# Patient Record
Sex: MALE | Race: ASIAN | NOT HISPANIC OR LATINO | Employment: FULL TIME | URBAN - METROPOLITAN AREA
[De-identification: names, ages, dates, MRNs, and addresses within clinical notes are randomized per-mention and may not be internally consistent; named-entity substitution may affect disease eponyms.]

---

## 2017-11-01 ENCOUNTER — OFFICE VISIT - HEALTHEAST (OUTPATIENT)
Dept: FAMILY MEDICINE | Facility: CLINIC | Age: 47
End: 2017-11-01

## 2017-11-01 DIAGNOSIS — K64.4 EXTERNAL HEMORRHOIDS: ICD-10-CM

## 2017-11-01 DIAGNOSIS — Z00.00 PHYSICAL EXAM: ICD-10-CM

## 2017-11-01 DIAGNOSIS — E78.5 HYPERLIPIDEMIA: ICD-10-CM

## 2017-11-01 ASSESSMENT — MIFFLIN-ST. JEOR: SCORE: 1497.35

## 2017-11-02 ENCOUNTER — AMBULATORY - HEALTHEAST (OUTPATIENT)
Dept: LAB | Facility: CLINIC | Age: 47
End: 2017-11-02

## 2017-11-02 DIAGNOSIS — E78.5 HYPERLIPIDEMIA: ICD-10-CM

## 2017-11-02 LAB
CHOLEST SERPL-MCNC: 231 MG/DL
FASTING STATUS PATIENT QL REPORTED: YES
HDLC SERPL-MCNC: 40 MG/DL
LDLC SERPL CALC-MCNC: 147 MG/DL
TRIGL SERPL-MCNC: 220 MG/DL

## 2017-11-03 ENCOUNTER — COMMUNICATION - HEALTHEAST (OUTPATIENT)
Dept: FAMILY MEDICINE | Facility: CLINIC | Age: 47
End: 2017-11-03

## 2018-09-26 ENCOUNTER — AMBULATORY - HEALTHEAST (OUTPATIENT)
Dept: NURSING | Facility: CLINIC | Age: 48
End: 2018-09-26

## 2018-09-26 DIAGNOSIS — Z23 FLU VACCINE NEED: ICD-10-CM

## 2018-11-14 ENCOUNTER — OFFICE VISIT - HEALTHEAST (OUTPATIENT)
Dept: FAMILY MEDICINE | Facility: CLINIC | Age: 48
End: 2018-11-14

## 2018-11-14 ENCOUNTER — RECORDS - HEALTHEAST (OUTPATIENT)
Dept: GENERAL RADIOLOGY | Facility: CLINIC | Age: 48
End: 2018-11-14

## 2018-11-14 ENCOUNTER — COMMUNICATION - HEALTHEAST (OUTPATIENT)
Dept: FAMILY MEDICINE | Facility: CLINIC | Age: 48
End: 2018-11-14

## 2018-11-14 DIAGNOSIS — S99.912A ANKLE INJURY, LEFT, INITIAL ENCOUNTER: ICD-10-CM

## 2018-11-14 DIAGNOSIS — S99.912A UNSPECIFIED INJURY OF LEFT ANKLE, INITIAL ENCOUNTER: ICD-10-CM

## 2018-11-14 DIAGNOSIS — E78.00 PURE HYPERCHOLESTEROLEMIA: ICD-10-CM

## 2018-11-14 DIAGNOSIS — L73.9 FOLLICULITIS: ICD-10-CM

## 2018-11-14 DIAGNOSIS — Z00.00 PHYSICAL EXAM: ICD-10-CM

## 2018-11-14 DIAGNOSIS — K64.4 EXTERNAL HEMORRHOIDS: ICD-10-CM

## 2018-11-14 LAB
ALBUMIN SERPL-MCNC: 4.5 G/DL (ref 3.5–5)
ALP SERPL-CCNC: 61 U/L (ref 45–120)
ALT SERPL W P-5'-P-CCNC: 46 U/L (ref 0–45)
ANION GAP SERPL CALCULATED.3IONS-SCNC: 10 MMOL/L (ref 5–18)
AST SERPL W P-5'-P-CCNC: 40 U/L (ref 0–40)
BILIRUB SERPL-MCNC: 0.7 MG/DL (ref 0–1)
BUN SERPL-MCNC: 21 MG/DL (ref 8–22)
CALCIUM SERPL-MCNC: 10.3 MG/DL (ref 8.5–10.5)
CHLORIDE BLD-SCNC: 102 MMOL/L (ref 98–107)
CHOLEST SERPL-MCNC: 252 MG/DL
CO2 SERPL-SCNC: 28 MMOL/L (ref 22–31)
CREAT SERPL-MCNC: 1.02 MG/DL (ref 0.7–1.3)
FASTING STATUS PATIENT QL REPORTED: YES
GFR SERPL CREATININE-BSD FRML MDRD: >60 ML/MIN/1.73M2
GLUCOSE BLD-MCNC: 98 MG/DL (ref 70–125)
HDLC SERPL-MCNC: 41 MG/DL
LDLC SERPL CALC-MCNC: 156 MG/DL
POTASSIUM BLD-SCNC: 4.6 MMOL/L (ref 3.5–5)
PROT SERPL-MCNC: 7.6 G/DL (ref 6–8)
SODIUM SERPL-SCNC: 140 MMOL/L (ref 136–145)
TRIGL SERPL-MCNC: 275 MG/DL

## 2018-11-14 ASSESSMENT — MIFFLIN-ST. JEOR: SCORE: 1513.56

## 2018-11-15 ENCOUNTER — RECORDS - HEALTHEAST (OUTPATIENT)
Dept: ADMINISTRATIVE | Facility: OTHER | Age: 48
End: 2018-11-15

## 2018-11-16 ENCOUNTER — COMMUNICATION - HEALTHEAST (OUTPATIENT)
Dept: FAMILY MEDICINE | Facility: CLINIC | Age: 48
End: 2018-11-16

## 2018-11-26 ENCOUNTER — RECORDS - HEALTHEAST (OUTPATIENT)
Dept: ADMINISTRATIVE | Facility: OTHER | Age: 48
End: 2018-11-26

## 2018-12-19 ENCOUNTER — RECORDS - HEALTHEAST (OUTPATIENT)
Dept: ADMINISTRATIVE | Facility: OTHER | Age: 48
End: 2018-12-19

## 2019-01-02 ENCOUNTER — RECORDS - HEALTHEAST (OUTPATIENT)
Dept: ADMINISTRATIVE | Facility: OTHER | Age: 49
End: 2019-01-02

## 2019-03-06 ENCOUNTER — RECORDS - HEALTHEAST (OUTPATIENT)
Dept: ADMINISTRATIVE | Facility: OTHER | Age: 49
End: 2019-03-06

## 2019-12-04 ENCOUNTER — OFFICE VISIT - HEALTHEAST (OUTPATIENT)
Dept: FAMILY MEDICINE | Facility: CLINIC | Age: 49
End: 2019-12-04

## 2019-12-04 DIAGNOSIS — E78.00 PURE HYPERCHOLESTEROLEMIA: ICD-10-CM

## 2019-12-04 DIAGNOSIS — L73.9 FOLLICULITIS: ICD-10-CM

## 2019-12-04 DIAGNOSIS — Z00.00 ROUTINE GENERAL MEDICAL EXAMINATION AT A HEALTH CARE FACILITY: ICD-10-CM

## 2019-12-04 LAB
ALBUMIN SERPL-MCNC: 4.4 G/DL (ref 3.5–5)
ALP SERPL-CCNC: 61 U/L (ref 45–120)
ALT SERPL W P-5'-P-CCNC: 42 U/L (ref 0–45)
ANION GAP SERPL CALCULATED.3IONS-SCNC: 8 MMOL/L (ref 5–18)
AST SERPL W P-5'-P-CCNC: 33 U/L (ref 0–40)
BILIRUB SERPL-MCNC: 0.9 MG/DL (ref 0–1)
BUN SERPL-MCNC: 14 MG/DL (ref 8–22)
CALCIUM SERPL-MCNC: 9.8 MG/DL (ref 8.5–10.5)
CHLORIDE BLD-SCNC: 102 MMOL/L (ref 98–107)
CHOLEST SERPL-MCNC: 252 MG/DL
CO2 SERPL-SCNC: 29 MMOL/L (ref 22–31)
CREAT SERPL-MCNC: 0.98 MG/DL (ref 0.7–1.3)
FASTING STATUS PATIENT QL REPORTED: YES
GFR SERPL CREATININE-BSD FRML MDRD: >60 ML/MIN/1.73M2
GLUCOSE BLD-MCNC: 108 MG/DL (ref 70–125)
HDLC SERPL-MCNC: 45 MG/DL
LDLC SERPL CALC-MCNC: 178 MG/DL
POTASSIUM BLD-SCNC: 4.5 MMOL/L (ref 3.5–5)
PROT SERPL-MCNC: 7.1 G/DL (ref 6–8)
PSA SERPL-MCNC: 1.1 NG/ML (ref 0–2.5)
SODIUM SERPL-SCNC: 139 MMOL/L (ref 136–145)
TRIGL SERPL-MCNC: 145 MG/DL

## 2019-12-04 RX ORDER — AMOXICILLIN 500 MG/1
500 TABLET, FILM COATED ORAL DAILY
Qty: 90 TABLET | Refills: 0 | Status: SHIPPED | OUTPATIENT
Start: 2019-12-04 | End: 2024-06-02

## 2019-12-04 ASSESSMENT — MIFFLIN-ST. JEOR: SCORE: 1490.88

## 2020-03-05 ENCOUNTER — COMMUNICATION - HEALTHEAST (OUTPATIENT)
Dept: FAMILY MEDICINE | Facility: CLINIC | Age: 50
End: 2020-03-05

## 2020-03-05 DIAGNOSIS — L73.9 FOLLICULITIS: ICD-10-CM

## 2020-03-11 ENCOUNTER — COMMUNICATION - HEALTHEAST (OUTPATIENT)
Dept: FAMILY MEDICINE | Facility: CLINIC | Age: 50
End: 2020-03-11

## 2020-03-11 DIAGNOSIS — L73.9 FOLLICULITIS: ICD-10-CM

## 2020-03-18 ENCOUNTER — COMMUNICATION - HEALTHEAST (OUTPATIENT)
Dept: FAMILY MEDICINE | Facility: CLINIC | Age: 50
End: 2020-03-18

## 2020-03-18 DIAGNOSIS — L73.9 FOLLICULITIS: ICD-10-CM

## 2021-03-02 ENCOUNTER — OFFICE VISIT - HEALTHEAST (OUTPATIENT)
Dept: FAMILY MEDICINE | Facility: CLINIC | Age: 51
End: 2021-03-02

## 2021-03-02 DIAGNOSIS — E55.9 VITAMIN D DEFICIENCY: ICD-10-CM

## 2021-03-02 DIAGNOSIS — E78.00 PURE HYPERCHOLESTEROLEMIA: ICD-10-CM

## 2021-03-02 DIAGNOSIS — Z00.00 ANNUAL PHYSICAL EXAM: ICD-10-CM

## 2021-03-02 DIAGNOSIS — N52.9 ERECTILE DYSFUNCTION, UNSPECIFIED ERECTILE DYSFUNCTION TYPE: ICD-10-CM

## 2021-03-02 DIAGNOSIS — Z13.1 SCREENING FOR DIABETES MELLITUS: ICD-10-CM

## 2021-03-02 DIAGNOSIS — L73.9 FOLLICULITIS: ICD-10-CM

## 2021-03-02 DIAGNOSIS — Z00.00 HEALTHCARE MAINTENANCE: ICD-10-CM

## 2021-03-02 DIAGNOSIS — Z12.5 SPECIAL SCREENING FOR MALIGNANT NEOPLASM OF PROSTATE: ICD-10-CM

## 2021-03-02 LAB
ALBUMIN SERPL-MCNC: 4.4 G/DL (ref 3.5–5)
ALP SERPL-CCNC: 59 U/L (ref 45–120)
ALT SERPL W P-5'-P-CCNC: 50 U/L (ref 0–45)
ANION GAP SERPL CALCULATED.3IONS-SCNC: 10 MMOL/L (ref 5–18)
AST SERPL W P-5'-P-CCNC: 44 U/L (ref 0–40)
BILIRUB SERPL-MCNC: 0.9 MG/DL (ref 0–1)
BUN SERPL-MCNC: 15 MG/DL (ref 8–22)
CALCIUM SERPL-MCNC: 9.5 MG/DL (ref 8.5–10.5)
CHLORIDE BLD-SCNC: 104 MMOL/L (ref 98–107)
CHOLEST SERPL-MCNC: 258 MG/DL
CO2 SERPL-SCNC: 28 MMOL/L (ref 22–31)
CREAT SERPL-MCNC: 0.95 MG/DL (ref 0.7–1.3)
FASTING STATUS PATIENT QL REPORTED: YES
GFR SERPL CREATININE-BSD FRML MDRD: >60 ML/MIN/1.73M2
GLUCOSE BLD-MCNC: 110 MG/DL (ref 70–125)
HBA1C MFR BLD: 6 %
HDLC SERPL-MCNC: 46 MG/DL
LDLC SERPL CALC-MCNC: 181 MG/DL
POTASSIUM BLD-SCNC: 4.5 MMOL/L (ref 3.5–5)
PROT SERPL-MCNC: 7.1 G/DL (ref 6–8)
PSA SERPL-MCNC: 1.5 NG/ML (ref 0–2.5)
SODIUM SERPL-SCNC: 142 MMOL/L (ref 136–145)
TRIGL SERPL-MCNC: 156 MG/DL

## 2021-03-02 RX ORDER — SILDENAFIL CITRATE 20 MG/1
TABLET ORAL
Qty: 30 TABLET | Refills: 0 | Status: SHIPPED | OUTPATIENT
Start: 2021-03-02 | End: 2022-06-21

## 2021-03-02 ASSESSMENT — MIFFLIN-ST. JEOR: SCORE: 1520.37

## 2021-03-03 LAB
25(OH)D3 SERPL-MCNC: 21.7 NG/ML (ref 30–80)
25(OH)D3 SERPL-MCNC: 21.7 NG/ML (ref 30–80)

## 2021-03-04 LAB
SHBG SERPL-SCNC: 27 NMOL/L (ref 11–80)
TESTOST FREE SERPL-MCNC: 6.75 NG/DL (ref 4.7–24.4)
TESTOST SERPL-MCNC: 306 NG/DL (ref 240–950)

## 2021-05-31 VITALS — BODY MASS INDEX: 25.87 KG/M2 | WEIGHT: 155.3 LBS | HEIGHT: 65 IN

## 2021-06-02 VITALS — BODY MASS INDEX: 26.33 KG/M2 | HEIGHT: 65 IN | WEIGHT: 158 LBS

## 2021-06-04 VITALS
SYSTOLIC BLOOD PRESSURE: 118 MMHG | HEART RATE: 66 BPM | WEIGHT: 153 LBS | OXYGEN SATURATION: 99 % | HEIGHT: 65 IN | BODY MASS INDEX: 25.49 KG/M2 | DIASTOLIC BLOOD PRESSURE: 80 MMHG

## 2021-06-05 VITALS
BODY MASS INDEX: 26.57 KG/M2 | SYSTOLIC BLOOD PRESSURE: 112 MMHG | DIASTOLIC BLOOD PRESSURE: 78 MMHG | HEIGHT: 65 IN | WEIGHT: 159.5 LBS | HEART RATE: 68 BPM

## 2021-06-06 NOTE — TELEPHONE ENCOUNTER
RN cannot approve Refill Request    RN can NOT refill this medication med is not covered by policy/route to provider       . Last office visit: Visit date not found Last Physical: 12/4/2019 Last MTM visit: Visit date not found Last visit same specialty: Visit date not found.  Next visit within 3 mo: Visit date not found  Next physical within 3 mo: Visit date not found      Makayla Vela, [facility] Triage/Med Refill 3/14/2020    Requested Prescriptions   Pending Prescriptions Disp Refills     amoxicillin (AMOXIL) 500 MG capsule [Pharmacy Med Name: Amoxicillin 500 MG Oral Capsule] 90 capsule 0     Sig: Take 1 capsule by mouth once daily       There is no refill protocol information for this order

## 2021-06-06 NOTE — TELEPHONE ENCOUNTER
RN cannot approve Refill Request    RN can NOT refill this medication med is not covered by policy/route to provider. Last office visit: Visit date not found Last Physical: 12/4/2019 Last MTM visit: Visit date not found Last visit same specialty: Visit date not found.  Next visit within 3 mo: Visit date not found  Next physical within 3 mo: Visit date not found      Amanda Cooper, [facility] Triage/Med Refill 3/5/2020    Requested Prescriptions   Pending Prescriptions Disp Refills     amoxicillin (AMOXIL) 500 MG capsule [Pharmacy Med Name: Amoxicillin 500 MG Oral Capsule] 90 capsule 0     Sig: Take 1 capsule by mouth once daily       There is no refill protocol information for this order

## 2021-06-07 NOTE — TELEPHONE ENCOUNTER
LM Pt should call  His dermatologist and ask for a refill on Amoxicillin. Dr Herring 12/4/2019 refill this med but noted that med was given to him by his dermatologist.

## 2021-06-13 NOTE — PROGRESS NOTES
ASSESSMENT:  1. Physical exam  Patient overall has good health habits though suboptimal exercise.    2. Hyperlipidemia  Mild elevation of cholesterol, controlling with lifestyle.    3. External hemorrhoids  Managed well with daily MiraLAX.      PLAN:  1.  Future laboratory studies as above, including lipid Cascade and comprehensive metabolic profile.  2.  Explained to the patient that he could take MiraLAX daily essentially indefinitely.  3.  Otherwise should be seen yearly.       No orders of the defined types were placed in this encounter.    Medications Discontinued During This Encounter   Medication Reason     ketoconazole (NIZORAL) 2 % shampoo Therapy completed     amoxicillin (AMOXIL) 500 MG capsule Therapy completed       No Follow-up on file.    CHIEF COMPLAINT:  Chief Complaint   Patient presents with     Annual Exam     Pt is NOT FASTING today- pending lab appt. tomorrow.        SUBJECTIVE:  Trini is a 46 y.o. male presenting to the clinic for an annual physical.    Health Maintenance: He has already received the influenza vaccine for the upcoming season. He is up-to-date on all of his other immunizations.    REVIEW OF SYSTEMS:   He continues to use Miralax for his hemorrhoids. They no longer bleed, and he does not struggle with constipation as long as he continues on the Miralax. His blood pressure is well-controlled. His lipids have been slightly elevated and will be rechecked. He has already eaten today and will therefore come back tomorrow morning for a lab only appointment. He deals with seasonal allergies, primarily during the spring. He denies any stomach issues or heartburn. He does not have any significant joint pains. He does not have a history of asthma or breathing problems.   All other systems are negative.    PFSH:  Social: He continues to work as a . He went to [facility] to visit family last summer.  Immunization History   Administered Date(s) Administered     DT (pediatric)  "06/29/2005     Hep A, historic 06/29/2005, 02/01/2008, 08/07/2008     Influenza, Seasonal, Inj PF 10/28/2010, 10/22/2011, 11/21/2012, 09/20/2013     Influenza, inj, historic 11/04/2008     Influenza, seasonal,quad inj 36+ mos 09/16/2016     MMR 01/19/1993     Td, historic 06/29/2005     Tdap 03/28/2013     Social History     Social History     Marital status:      Spouse name: N/A     Number of children: 2     Years of education: N/A     Occupational History           Social History Main Topics     Smoking status: Former Smoker     Types: Cigarettes     Quit date: 9/10/2003     Smokeless tobacco: Never Used      Comment: Light     Alcohol use Yes      Comment: Rare     Drug use: No     Sexual activity: Yes     Partners: Female     Other Topics Concern     Not on file     Social History Narrative    Diet- Regular, mostly  food.        Exercise- Not regular, walking 3-4x per week, playing with kids        Originally from [facility]; [place].     History reviewed. No pertinent past medical history.  Family History   Problem Relation Age of Onset     Cancer Mother      Ovarian     Parkinsonism Father      Hypotension Father      Benign prostatic hyperplasia Father      Hyperlipidemia Brother      Dementia Paternal Grandfather      Parkinsonism Paternal Grandfather        MEDICATIONS:  Current Outpatient Prescriptions   Medication Sig Dispense Refill     multivitamin therapeutic (THERAGRAN) tablet Take 1 tablet by mouth daily.       No current facility-administered medications for this visit.        TOBACCO USE:  History   Smoking Status     Former Smoker     Types: Cigarettes     Quit date: 9/10/2003   Smokeless Tobacco     Never Used     Comment: Light       VITALS:  Vitals:    11/01/17 1536   BP: 124/70   Patient Site: Right Arm   Patient Position: Sitting   Cuff Size: Adult Regular   Pulse: 78   Weight: 155 lb 4.8 oz (70.4 kg)   Height: 5' 4.75\" (1.645 m)     Wt Readings from Last 3 " Encounters:   11/01/17 155 lb 4.8 oz (70.4 kg)   09/16/16 148 lb (67.1 kg)   09/10/15 152 lb (68.9 kg)       PHYSICAL EXAM:  Constitutional:   Reveals an alert, pleasant, talkative male.  Vitals: per nursing notes.  HEENT: Right Ear: External ear normal.   Left Ear: External ear normal.   Nose: Nose normal.   Mouth/Throat: Oropharynx is clear and moist.   Eyes: Conjunctivae and EOM are normal. Pupils are equal, round, and reactive to light. Right eye exhibits no discharge. Left eye exhibits no discharge.   Neck:  Supple, no carotid bruits or adenopathy.  Back:  No spine or CVA pain.  Thorax:  No bony deformities.  Lungs: Clear to A&P without rales or wheezes.  Respiratory effort normal.  Cardiac:   Regular rate and rhythm, normal S1, S2, no murmur or gallop.  Abdomen:  Soft, active bowel sounds without bruits, mass, or tenderness.  Extremities:   No peripheral edema, pulses in the feet intact.    Skin:  No jaundice, peripheral cyanosis or lesions to suggest malignancy.  Neuro:  Alert and oriented. Cranial nerves, motor, sensory exams are intact.  No gross focal deficits.  Psychiatric:  Memory intact, mood appropriate.    DATA REVIEWED:  Additional History from Old Records Summarized (2): None.  Decision to Obtain Records (1): None.  Radiology Tests Summarized or Ordered (1): None.  Labs Reviewed or Ordered (1): Ordered labs; lipid cascade, comprehensive metabolic panel.   Medicine Test Summarized or Ordered (1): None.  Independent Review of EKG, X-RAY, or RAPID STREP (2 each): None.     The visit lasted a total of 8 minutes face to face with the patient. Over 50% of the time was spent counseling and educating the patient about health maintenance.    IGadiel, am scribing for and in the presence of, Dr. Powell.    I, Dr. Powell, personally performed the services described in this documentation, as scribed by Gadiel Morelos in my presence, and it is both accurate and complete.    Total Data Points: 1

## 2021-06-15 NOTE — PROGRESS NOTES
Assessment:   1. Annual physical exam  His biggest issue is that his hemoglobin A1c came back at 6.0 today.  It sounds like he drinks soda on a daily basis.    2. Healthcare maintenance  We spent some time talking about the Shingrix vaccine.  He can start this at age 50.  Colonoscopy is to start at age 52.  His father has BPH and he would like a PSA checked today.    3. Erectile dysfunction, unspecified erectile dysfunction type  It sounds like there may be some psychosocial issues at play here.  I did give him a small prescription for sildenafil to use.  He would also like his testosterone checked today  - sildenafil (REVATIO) 20 mg tablet; Take three tablets 60 minutes before intercourse  Dispense: 30 tablet; Refill: 0  - Testosterone, Free and Total (BTEST)    4. Pure hypercholesterolemia  He does not meet criteria for statin drugs but that would change if he were to develop type 2 diabetes  - Lipid Profile  - Comprehensive Metabolic Panel    5. Screening for diabetes mellitus  He needs to cut down on his carbs and exercise more frequently.  - Glycosylated Hemoglobin A1c    6. Vitamin D deficiency  - Vitamin D, Total (25-Hydroxy)    7. Special screening for malignant neoplasm of prostate  - PSA (Prostatic-Specific Antigen), Annual Screen    8. Folliculitis  Uses amoxicillin intermittently as needed for folliculitis.  He is not currently dealing with a flare         Plan:     Patient Instructions   Blood pressure and other vital signs look good today.    Goal weight loss 10 pounds over the next calendar year.    1 scoop of Citrucel in an 8 ounce glass of water every day.    More vegetables.    No more soda.    Recheck cholesterol labs today.  Based on last year's labs, you are at a 2.9% 10-year stroke/heart attack risk.  This does not meet criteria for cholesterol medications at this point.    We will check a hemoglobin A1c this year.    We will check testosterone levels; if abnormal, we will refer you onto  endocrinology.    Prescription for Revatio 20 mg tablets sent into the [facility] pharmacy.  Take 3 tablets 60 minutes before sexual activity.    I would recommend the Shingrix shingles vaccine when you turn 50 years old    Results of today's labs will be [facility] to you [facility].    Follow-up in 1 year, sooner if needed.      Subjective:     Here for physical exam. Chart reviewed       No past medical history on file.  No past surgical history on file.  Patient has no known allergies.  Family History   Problem Relation Age of Onset     Cancer Mother         Ovarian     Parkinsonism Father      Hypotension Father      Benign prostatic hyperplasia Father      Hyperlipidemia Brother      Dementia Paternal Grandfather      Parkinsonism Paternal Grandfather      Social History     Socioeconomic History     Marital status:      Spouse name: Not on file     Number of children: 2     Years of education: Not on file     Highest education level: Not on file   Occupational History     Occupation:    Social Needs     Financial resource strain: Not on file     Food insecurity     Worry: Not on file     Inability: Not on file     Transportation needs     Medical: Not on file     Non-medical: Not on file   Tobacco Use     Smoking status: Former Smoker     Types: Cigarettes     Quit date: 9/10/2003     Years since quittin.4     Smokeless tobacco: Never Used     Tobacco comment: Light   Substance and Sexual Activity     Alcohol use: No     Frequency: Never     Comment:       Drug use: No     Sexual activity: Yes     Partners: Female   Lifestyle     Physical activity     Days per week: Not on file     Minutes per session: Not on file     Stress: Not on file   Relationships     Social connections     Talks on phone: Not on file     Gets together: Not on file     Attends Confucianism service: Not on file     Active member of club or organization: Not on file     Attends meetings of clubs or  "organizations: Not on file     Relationship status: Not on file     Intimate partner violence     Fear of current or ex partner: Not on file     Emotionally abused: Not on file     Physically abused: Not on file     Forced sexual activity: Not on file   Other Topics Concern     Not on file   Social History Narrative    Diet- Regular, mostly  food.        Exercise- Treadmill, exercise videos, walking 3-4x per week, playing with kids        Originally from [place]; [place].         Review of Systems  Please see form B           Objective:     Vitals:    03/02/21 0800   BP: 112/78   Pulse: 68   Weight: 159 lb 8 oz (72.3 kg)   Height: 5' 5\" (1.651 m)       Physical  General Appearance: Alert, cooperative, no distress, appears stated age  Head: Normocephalic, without obvious abnormality, atraumatic  Eyes: PERRL, fundi not observed, EOM's intact  Ears: Normal TM's and external ear canals bilateral  Nose: No abnormalities noted  Mouth and Throat: Normal appearance   Neck: Supple, symmetrical, trachea midline, no adenopathy or thyromegally,  no tenderness/mass/nodules; no carotid bruit or JVD  Back: no CVA tenderness or spinous process pain  Lungs: Clear to auscultation bilaterally, good air movent  Heart: Regular rate and rhythm, S1 and S2 normal, no murmur, rub, or gallop,  Abdomen: Soft, non-tender, no masses, no organomegaly  Genitourinary:.  No hernias   Rectal exam: Not done  Musculoskeletal: No gross abnormalities    Extremities: Extremities normal, atraumatic, no cyanosis or edema, pulses 2/4 and symmetric  Skin:  no  worrisome lesions noted  Lymph nodes: Cervical, supraclavicular, groin, and axillary nodes normal  Neurologic: CN2-12 intact, normal sensation, DTRs 2/4 and symmetric.   Psychiatric:  normal mood and affect.      Current Outpatient Medications   Medication Sig Dispense Refill     amoxicillin (AMOXIL) 500 MG tablet Take 1 tablet (500 mg total) by mouth daily. (Patient taking differently: Take " 500 mg by mouth as needed. ) 90 tablet 0     MULTIVITAMIN ORAL Take by mouth daily.       sildenafil (REVATIO) 20 mg tablet Take three tablets 60 minutes before intercourse 30 tablet 0     No current facility-administered medications for this visit.

## 2021-06-15 NOTE — PATIENT INSTRUCTIONS - HE
Blood pressure and other vital signs look good today.    Goal weight loss 10 pounds over the next calendar year.    1 scoop of Citrucel in an 8 ounce glass of water every day.    More vegetables.    No more soda.    Recheck cholesterol labs today.  Based on last year's labs, you are at a 2.9% 10-year stroke/heart attack risk.  This does not meet criteria for cholesterol medications at this point.    We will check a hemoglobin A1c this year.    We will check testosterone levels; if abnormal, we will refer you onto endocrinology.    Prescription for Revatio 20 mg tablets sent into the [facility] pharmacy.  Take 3 tablets 60 minutes before sexual activity.    I would recommend the Shingrix shingles vaccine when you turn 50 years old    Results of today's labs will be made available to you on [facility].    Follow-up in 1 year, sooner if needed.

## 2021-06-16 PROBLEM — L73.9 FOLLICULITIS: Status: ACTIVE | Noted: 2018-11-14

## 2021-06-26 NOTE — PROGRESS NOTES
Progress Notes by Loy Powell MD at 11/14/2018  3:30 PM     Author: Loy Powell MD Service: -- Author Type: Physician    Filed: 11/14/2018  5:41 PM Encounter Date: 11/14/2018 Status: Signed    : Loy Powell MD (Physician)       MALE PREVENTATIVE EXAM    Assessment and Plan:       1. Physical exam  Patient overall has good health habits.    2.  Hyperlipidemia  Mild elevation in the could be some genetics behind this.  - Comprehensive Metabolic Panel  - Lipid Cascade    3. External hemorrhoids  Conservative management, cream and stool softeners as needed.    4. Ankle injury, left, initial encounter  Left ankle x-ray shows a left distal fibular fracture.  - XR Ankle Left 3 or More VWS; Future    5. Folliculitis  Daily amoxicillin controlled, followed by dermatology.    PLAN:  1.  Left ankle x-ray results reviewed, I left a message on the patient's phone, I will put in an urgent referral for podiatry.  In the meantime I told him to minimize weightbearing if he has any type of support boot he can use that otherwise Ace wrap, ice or anti-inflammatories.  2.  Laboratory studies as above.  3.  Otherwise patient can continue his same occasions, should be seen again in 1 year.        Next follow up:  No Follow-up on file.    Immunization Review  Adult Imm Review: No immunizations due today    I discussed the following with the patient:   Adult Healthy Living: Importance of regular exercise  Healthy nutrition        Subjective:   Chief Complaint: Trini Salter is an 47 y.o. male here for a preventative health visit.     HPI:  Physical    Patient states that he has no changes to his health. He mentions that last night he twisted his left ankle. He is able to walk on his ankle with moderate pain but at a slower pace.     He explains that he feels more tired and fatigued after activity than he did in the past. He notes that he sleeps well but occasionally wakes up during the night and is able to fall right  "back to sleep. He exercises often by using the treadmill and exercise videos that consist of light lifting. He takes an antibiotic for his skin and he regularly sees a dermatologist. He also has a history of hemorrhoids which are well controlled and he takes Miralax when necessary.        Healthy Habits  Are you taking a daily aspirin? No  Do you typically exercising at least 40 min, 3-4 times per week?  Yes  Do you usually eat at least 4 servings of fruit and vegetables a day, include whole grains and fiber and avoid regularly eating high fat foods? Yes  Have you had an eye exam in the past two years? Yes  Do you see a dentist twice per year? Yes  Do you have any concerns regarding sleep? No    Safety Screen  If you own firearms, are they secured in a locked gun cabinet or with trigger locks? The patient does not own any firearms  Do you feel you are safe where you are living?: Yes (11/14/2018  3:38 PM)  Do you feel you are safe in your relationship(s)?: Yes (11/14/2018  3:38 PM)      Review of Systems:  Please see above.  The rest of the review of systems are negative for all systems.     Cancer Screening     Patient has no health maintenance due at this time          Patient Care Team:  Loy Powell MD as PCP - General (Family Medicine)        History     Reviewed By Date/Time Sections Reviewed    Loy Powell MD 11/14/2018  4:02 PM Medical    Loy Powell MD 11/14/2018  4:00 PM Social Documentation    Loy Powell MD 11/14/2018  3:55 PM Medical, Surgical, Tobacco, Alcohol, Drug Use, Sexual Activity, Family, Social Documentation, Socioeconomic, Lifestyle, Relationships    Shanna Dominguez [facility] 11/14/2018  3:46 PM Family    Shanna Dominguez [facility] 11/14/2018  3:45 PM Tobacco            Objective:   Vital Signs:   Visit Vitals  /82   Pulse 62   Temp 98.2  F (36.8  C) (Oral)   Resp 16   Ht 5' 5\" (1.651 m)   Wt 158 lb (71.7 kg)   SpO2 100%   BMI 26.29 kg/m           PHYSICAL EXAM  Constitutional:   Reveals a " who appears his stated age.  Vitals: per nursing notes.  HEENT: Right Ear: External ear normal.   Left Ear: External ear normal.   Nose: Nose normal.   Mouth/Throat: Oropharynx is clear and moist.   Eyes: Conjunctivae and EOM are normal. Pupils are equal, round, and reactive to light. Right eye exhibits no discharge. Left eye exhibits no discharge.   Neck:  Supple, no carotid bruits or adenopathy.  Back:  No spine or CVA pain.  Thorax:  No bony deformities.  Lungs: Clear to A&P without rales or wheezes.  Respiratory effort normal.  Cardiac:   Regular rate and rhythm, normal S1, S2, no murmur or gallop.  Abdomen:  Soft, active bowel sounds without bruits, mass, or tenderness.  Extremities:   No peripheral edema, pulses in the feet intact. Ankle: Left ankle swelling and tenderness, lateral joint  Skin:  No jaundice, peripheral cyanosis or lesions to suggest malignancy.  Neuro:  Alert and oriented. Cranial nerves, motor, sensory exams are intact.  No gross focal deficits.  Psychiatric:  Memory intact, mood appropriate.    ADDITIONAL HISTORY SUMMARIZED (2): None.   DECISION TO OBTAIN EXTRA INFORMATION (1): None.   RADIOLOGY TESTS (1): None.  LABS (1): Labs ordered.  MEDICINE TESTS (1): None.  INDEPENDENT REVIEW (2 each): Ordered and reviewed X-ray: Left distal fibular fracture    Total data points = 3    Total time was 11 minutes, greater than 50% counseling and coordinating care regarding the above issues.        The ASCVD Risk score ([place] MARCELA Aguilar., et al., 2013) failed to calculate for the following reasons:    The BP treatment status is invalid         Medication List           Accurate as of 11/14/18  4:21 PM. If you have any questions, ask your nurse or doctor.               CONTINUE taking these medications    amoxicillin 500 MG tablet  Also known as:  AMOXIL  INSTRUCTIONS:  Take 500 mg by mouth daily.           STOP taking these medications    multivitamin therapeutic tablet  Also known as:  THERAGRAN  Stopped by:   Loy Powell MD            Additional Screenings Completed Today:     By signing my name below, I, Wilfredo Richards, attest that this documentation has been prepared under the direction and in the presence of Dr. Loy Powell.  Electronic Signature: Aguilar Munoz. 11/14/2018 3:56 PM.    I, Dr. Powell, personally performed the services described in this documentation. All medical record entries made by the scribe were at my direction and in my presence. I have reviewed the chart and discharge instructions (if applicable) and agree that the record reflects my personal performance and is accurate and complete.

## 2021-06-28 NOTE — PROGRESS NOTES
Progress Notes by Santi Lazaro MD at 12/4/2019  8:00 AM     Author: Santi Lazaro MD Service: -- Author Type: Physician    Filed: 12/4/2019  9:19 AM Encounter Date: 12/4/2019 Status: Signed    : Santi Lazaro MD (Physician)       MALE PREVENTATIVE EXAM    Assessment and Plan:     Routine general medical examination at a health care facility  -     PSA (Prostatic-Specific Antigen), Annual Screen  We discussed healthy lifestyle, nutrition, cardiovascular risk reduction, self care, safety, sunscreen, seatbelt, and timing of cancer screening.  Health maintenance screening and immunizations reviewed with the patient.    Pure hypercholesterolemia  -     Lipid Cascade  -     Comprehensive Metabolic Panel  Not on meds  Counseled healthy lifestyle modifications    Folliculitis, scalp  Chronic intermittent use of amoxicillin  Will refill  Advised probiotics  -     amoxicillin (AMOXIL) 500 MG tablet; Take 1 tablet (500 mg total) by mouth daily.    Next follow up:  Return in about 1 year (around 12/4/2020).    Immunization Review  Adult Imm Review: No immunizations due today    I discussed the following with the patient:   Adult Healthy Living: Importance of regular exercise  Healthy nutrition  Getting adequate sleep  Stress management  Supplement use    Subjective:   Chief Complaint: Trini Salter is an 48 y.o. male here for a preventative health visit.     HPI:    Hyperlipidemia: He has a history of elevated cholesterol levels. He is not currently taking medications for his cholesterol. He tends to consume either rice or noodles every day. He does not normally consume large amounts of sugar. He inquires about what causes the high cholesterol.     Folliculitis: He has been taking amoxicillin 500 mg for his folliculitis through the dermatologist. He takes it twice a day for a week and then once a day for a few weeks. He does not take the medication continuously. He will take  "it for about a month and then not take it for a few months. The folliculitis will come back on the scalp if he does not take the amoxicillin. He has tried other medications, unspecified, without any success. He inquires about receiving a refill for amoxicillin today.     Health Maintenance: He is fasting today. He is due for a PSA screening today. His father has BPH. He had an influenza vaccination through work.     Review of Systems: Please see above. The rest of the review of systems are negative for all systems.     PSFH:  He has two children who are 11 and 12 years old. He is originally from [facility]. His parents still live there.     Healthy Habits  Are you taking a daily aspirin? No  Do you typically exercising at least 40 min, 3-4 times per week?  Yes  Do you usually eat at least 4 servings of fruit and vegetables a day, include whole grains and fiber and avoid regularly eating high fat foods? Yes  Have you had an eye exam in the past two years? Yes  Do you see a dentist twice per year? Yes  Do you have any concerns regarding sleep? YES wakes a few times     Safety Screen  If you own firearms, are they secured in a locked gun cabinet or with trigger locks? NO  Do you feel you are safe where you are living?: Yes (12/4/2019  7:44 AM)  Do you feel you are safe in your relationship(s)?: Yes (12/4/2019  7:44 AM)      Cancer Screening     Patient has no health maintenance due at this time          Patient Care Team:  Loy Powell MD as PCP - General (Family Medicine)  Loy Powell MD as Assigned PCP        History     Reviewed By Date/Time Sections Reviewed    Kymberly Toney CMA 12/4/2019  7:50 AM Tobacco            Objective:   Vital Signs:   Visit Vitals  /80 (Patient Site: Left Arm, Patient Position: Sitting, Cuff Size: Adult Regular)   Pulse 66   Ht 5' 5\" (1.651 m)   Wt 153 lb (69.4 kg)   SpO2 99%   BMI 25.46 kg/m           PHYSICAL EXAM  Constitutional: Patient is oriented to person, place, " and time. Patient appears well-developed and well-nourished. No distress.   Head: Normocephalic and atraumatic.   Right Ear: External ear normal.   Left Ear: External ear normal.   Nose: Nose normal.   Mouth/Throat: Oropharynx is clear and moist. No oropharyngeal exudate.   Eyes: Conjunctivae and EOM are normal. Pupils are equal, round, and reactive to light. Right eye exhibits no discharge. Left eye exhibits no discharge. No scleral icterus.   Neck: Neck supple. No JVD present. No tracheal deviation present. No thyromegaly present.   Cardiovascular: Normal rate, regular rhythm, normal heart sounds and intact distal pulses.   No murmur heard.   Pulmonary/Chest: Effort normal and breath sounds normal. No stridor. No respiratory distress. Patient has no wheezes, no rales, exhibits no tenderness.   Abdominal: Soft. Bowel sounds are normal. Patient exhibits no distension and no mass.  There is no tenderness. There is no rebound and no guarding.  No inguinal hernia on valsalva maneuver  Lymphadenopathy:  Patient has no cervical adenopathy.   Neurological: Patient is alert and oriented to person, place, and time. Patient has normal reflexes. No cranial nerve deficit. Coordination normal.   Skin: Skin is warm and dry. Patient is not diaphoretic. No erythema. No pallor. A few acne-like lesions on the scalp.  Psychiatric: Patient has good eye contact without any psychomotor retardation or stereotypic behaviors.  normal mood and affect. Judgment and thought content normal.   Speech is regular rate and rhythm.     The 10-year ASCVD risk score (Raphaelisidro MEDRANO Jr., et al., 2013) is: 3.6%    Values used to calculate the score:      Age: 48 years      Sex: Male      Is Non- : No      Diabetic: No      Tobacco smoker: No      Systolic Blood Pressure: 118 mmHg      Is BP treated: No      HDL Cholesterol: 41 mg/dL      Total Cholesterol: 252 mg/dL    ADDITIONAL HISTORY SUMMARIZED (2): Reviewed physical note from  11/14/18 showing mild hyperlipidemia.  DECISION TO OBTAIN EXTRA INFORMATION (1): None.   RADIOLOGY TESTS (1): None.  LABS (1): Reviewed labs from 11/14/18 and ordered labs today.  MEDICINE TESTS (1): None.  INDEPENDENT REVIEW (2 each): None.     The visit lasted a total of 13 minutes face to face with the patient. Over 50% of the time was spent counseling and educating the patient about continuous antibiotic usage and overall wellness.    IPascale, am scribing for and in the presence of, Dr. Herring.    IDr. Herring, personally performed the services described in this documentation, as scribed by Pascale Velasco in my presence, and it is both accurate and complete.    Total data points: 3       Medication List          Accurate as of December 4, 2019  9:17 AM. If you have any questions, ask your nurse or doctor.            CONTINUE taking these medications    amoxicillin 500 MG tablet  Also known as:  AMOXIL  INSTRUCTIONS:  Take 1 tablet (500 mg total) by mouth daily.              Where to Get Your Medications      These medications were sent to [facility] [address] [place] [id number]    Phone:  [phone]     amoxicillin 500 MG tablet         Additional Screenings Completed Today:

## 2021-10-11 ENCOUNTER — HEALTH MAINTENANCE LETTER (OUTPATIENT)
Age: 51
End: 2021-10-11

## 2022-05-10 ENCOUNTER — TRANSFERRED RECORDS (OUTPATIENT)
Dept: HEALTH INFORMATION MANAGEMENT | Facility: CLINIC | Age: 52
End: 2022-05-10
Payer: COMMERCIAL

## 2022-05-22 ENCOUNTER — HEALTH MAINTENANCE LETTER (OUTPATIENT)
Age: 52
End: 2022-05-22

## 2022-06-21 ENCOUNTER — OFFICE VISIT (OUTPATIENT)
Dept: FAMILY MEDICINE | Facility: CLINIC | Age: 52
End: 2022-06-21
Payer: COMMERCIAL

## 2022-06-21 VITALS
SYSTOLIC BLOOD PRESSURE: 116 MMHG | BODY MASS INDEX: 25.21 KG/M2 | OXYGEN SATURATION: 98 % | HEIGHT: 65 IN | DIASTOLIC BLOOD PRESSURE: 64 MMHG | WEIGHT: 151.3 LBS | HEART RATE: 60 BPM

## 2022-06-21 DIAGNOSIS — Z00.00 ROUTINE GENERAL MEDICAL EXAMINATION AT A HEALTH CARE FACILITY: Primary | ICD-10-CM

## 2022-06-21 DIAGNOSIS — R73.03 PREDIABETES: ICD-10-CM

## 2022-06-21 DIAGNOSIS — Z12.11 SCREEN FOR COLON CANCER: ICD-10-CM

## 2022-06-21 DIAGNOSIS — E78.49 OTHER HYPERLIPIDEMIA: ICD-10-CM

## 2022-06-21 DIAGNOSIS — Z00.00 PHYSICAL EXAM: ICD-10-CM

## 2022-06-21 DIAGNOSIS — Z11.4 SCREENING FOR HIV (HUMAN IMMUNODEFICIENCY VIRUS): ICD-10-CM

## 2022-06-21 DIAGNOSIS — Z11.59 NEED FOR HEPATITIS C SCREENING TEST: ICD-10-CM

## 2022-06-21 LAB
ALBUMIN SERPL-MCNC: 3.9 G/DL (ref 3.5–5)
ALP SERPL-CCNC: 54 U/L (ref 45–120)
ALT SERPL W P-5'-P-CCNC: 38 U/L (ref 0–45)
ANION GAP SERPL CALCULATED.3IONS-SCNC: 8 MMOL/L (ref 5–18)
AST SERPL W P-5'-P-CCNC: 33 U/L (ref 0–40)
BILIRUB SERPL-MCNC: 0.7 MG/DL (ref 0–1)
BUN SERPL-MCNC: 17 MG/DL (ref 8–22)
CALCIUM SERPL-MCNC: 9.2 MG/DL (ref 8.5–10.5)
CHLORIDE BLD-SCNC: 106 MMOL/L (ref 98–107)
CHOLEST SERPL-MCNC: 223 MG/DL
CO2 SERPL-SCNC: 26 MMOL/L (ref 22–31)
CREAT SERPL-MCNC: 0.89 MG/DL (ref 0.7–1.3)
FASTING STATUS PATIENT QL REPORTED: YES
GFR SERPL CREATININE-BSD FRML MDRD: >90 ML/MIN/1.73M2
GLUCOSE BLD-MCNC: 106 MG/DL (ref 70–125)
HBA1C MFR BLD: 5.8 % (ref 0–5.6)
HDLC SERPL-MCNC: 46 MG/DL
LDLC SERPL CALC-MCNC: 152 MG/DL
POTASSIUM BLD-SCNC: 4 MMOL/L (ref 3.5–5)
PROT SERPL-MCNC: 6.8 G/DL (ref 6–8)
SODIUM SERPL-SCNC: 140 MMOL/L (ref 136–145)
TRIGL SERPL-MCNC: 124 MG/DL

## 2022-06-21 PROCEDURE — 80053 COMPREHEN METABOLIC PANEL: CPT | Performed by: FAMILY MEDICINE

## 2022-06-21 PROCEDURE — 36415 COLL VENOUS BLD VENIPUNCTURE: CPT | Performed by: FAMILY MEDICINE

## 2022-06-21 PROCEDURE — 83036 HEMOGLOBIN GLYCOSYLATED A1C: CPT | Performed by: FAMILY MEDICINE

## 2022-06-21 PROCEDURE — 80061 LIPID PANEL: CPT | Performed by: FAMILY MEDICINE

## 2022-06-21 PROCEDURE — 99396 PREV VISIT EST AGE 40-64: CPT | Performed by: FAMILY MEDICINE

## 2022-06-21 ASSESSMENT — ENCOUNTER SYMPTOMS
ARTHRALGIAS: 0
DIARRHEA: 0
HEMATURIA: 0
WEAKNESS: 0
ABDOMINAL PAIN: 0
EYE PAIN: 0
SORE THROAT: 0
DYSURIA: 0
FREQUENCY: 0
JOINT SWELLING: 0
SHORTNESS OF BREATH: 0
HEARTBURN: 0
COUGH: 0
NERVOUS/ANXIOUS: 0
PARESTHESIAS: 0
MYALGIAS: 0
HEMATOCHEZIA: 0
NAUSEA: 0
PALPITATIONS: 0
DIZZINESS: 0
CHILLS: 0
HEADACHES: 0
FEVER: 0
CONSTIPATION: 0

## 2022-06-21 NOTE — PROGRESS NOTES
SUBJECTIVE:   CC: Trini Salter is an 51 year old male who presents for preventative health visit.   Patient has been advised of split billing requirements and indicates understanding: Yes     HPI  Patient comes in for his annual physical examination.  Patient does have a history of hyperlipidemia going back quite a number of years attempting to control with lifestyle his most recent LDL was 181.    Patient also was diagnosed with prediabetes last year with an A1c of 6.0.    Patient has a history of recurrent folliculitis on daily amoxicillin followed by dermatology.    Patient also has a history of hemorrhoids he takes MiraLAX daily which helps control symptoms.    Overall the patient does have good health habits in terms of diet and exercise up-to-date on immunizations we will go ahead and refer for colonoscopy.            Healthy Habits:     Getting at least 3 servings of Calcium per day:  NO    Bi-annual eye exam:  Yes    Dental care twice a year:  Yes    Sleep apnea or symptoms of sleep apnea:  None    Diet:  Regular (no restrictions)    Frequency of exercise:  2-3 days/week    Duration of exercise:  15-30 minutes    Taking medications regularly:  Yes    Medication side effects:  Not applicable    PHQ-2 Total Score: 0    Additional concerns today:  No               Today's PHQ-2 Score:   PHQ-2 ( 1999 Pfizer) 6/21/2022   Q1: Little interest or pleasure in doing things 0   Q2: Feeling down, depressed or hopeless 0   PHQ-2 Score 0   Q1: Little interest or pleasure in doing things Not at all   Q2: Feeling down, depressed or hopeless Not at all   PHQ-2 Score 0       Abuse: Current or Past(Physical, Sexual or Emotional)- No  Do you feel safe in your environment? Yes    Have you ever done Advance Care Planning? (For example, a Health Directive, POLST, or a discussion with a medical provider or your loved ones about your wishes): No, advance care planning information given to patient to review.  Patient declined advance  care planning discussion at this time.    Social History     Tobacco Use     Smoking status: Former Smoker     Types: Cigarettes, Cigarettes     Quit date: 9/10/2003     Years since quittin.7     Smokeless tobacco: Never Used     Tobacco comment: Light   Substance Use Topics     Alcohol use: No     Comment: Alcoholic Drinks/day:       If you drink alcohol do you typically have >3 drinks per day or >7 drinks per week? No    Alcohol Use 2022   Prescreen: >3 drinks/day or >7 drinks/week? No       Last PSA:   Prostate Specific Antigen Screen   Date Value Ref Range Status   2021 1.5 0.0 - 2.5 ng/mL Final       Reviewed orders with patient. Reviewed health maintenance and updated orders accordingly - Yes  Lab work is in process    Reviewed and updated as needed this visit by clinical staff    Allergies                 Reviewed and updated as needed this visit by Provider                   History reviewed. No pertinent past medical history.   History reviewed. No pertinent surgical history.    Review of Systems   Constitutional: Negative for chills and fever.   HENT: Negative for congestion, ear pain, hearing loss and sore throat.    Eyes: Negative for pain and visual disturbance.   Respiratory: Negative for cough and shortness of breath.    Cardiovascular: Negative for chest pain, palpitations and peripheral edema.   Gastrointestinal: Negative for abdominal pain, constipation, diarrhea, heartburn, hematochezia and nausea.   Genitourinary: Negative for dysuria, frequency, genital sores, hematuria, impotence, penile discharge and urgency.   Musculoskeletal: Negative for arthralgias, joint swelling and myalgias.   Neurological: Negative for dizziness, weakness, headaches and paresthesias.   Psychiatric/Behavioral: Negative for mood changes. The patient is not nervous/anxious.      CONSTITUTIONAL: NEGATIVE for fever, chills, change in weight  INTEGUMENTARY/SKIN: NEGATIVE for worrisome rashes, moles or  "lesions  EYES: NEGATIVE for vision changes or irritation  ENT: NEGATIVE for ear, mouth and throat problems  RESP: NEGATIVE for significant cough or SOB  CV: NEGATIVE for chest pain, palpitations or peripheral edema  GI: NEGATIVE for nausea, abdominal pain, heartburn, or change in bowel habits   male: negative for dysuria, hematuria, decreased urinary stream, erectile dysfunction, urethral discharge  MUSCULOSKELETAL: NEGATIVE for significant arthralgias or myalgia  NEURO: NEGATIVE for weakness, dizziness or paresthesias  PSYCHIATRIC: NEGATIVE for changes in mood or affect    OBJECTIVE:   Ht 1.651 m (5' 5\")   BMI 26.54 kg/m      Physical Exam  GENERAL: healthy, alert and no distress  EYES: Eyes grossly normal to inspection, PERRL and conjunctivae and sclerae normal  HENT: ear canals and TM's normal, nose and mouth without ulcers or lesions  NECK: no adenopathy, no asymmetry, masses, or scars and thyroid normal to palpation  RESP: lungs clear to auscultation - no rales, rhonchi or wheezes  CV: regular rate and rhythm, normal S1 S2, no S3 or S4, no murmur, click or rub, no peripheral edema and peripheral pulses strong  ABDOMEN: soft, nontender, no hepatosplenomegaly, no masses and bowel sounds normal  MS: no gross musculoskeletal defects noted, no edema  SKIN: no suspicious lesions or rashes  NEURO: Normal strength and tone, mentation intact and speech normal  PSYCH: mentation appears normal, affect normal/bright    Diagnostic Test Results:  Labs reviewed in Epic    ASSESSMENT/PLAN:       (Z12.11) Screen for colon cancer  Comment: Patient is due for colorectal cancer screening  Plan: Adult Gastro Ref - Procedure Only             (Z00.00) Physical exam  Comment: Overall the patient does have good health have  Plan:      (E78.49) Hyperlipidemia  Comment: Elevated for a number of years attempting to control with lifestyle  Plan: Comprehensive metabolic panel, Lipid panel         reflex to direct LDL Fasting         " "    (R73.03) Prediabetes  Comment: Diagnosed in 2021  Plan: Hemoglobin A1c             PLAN:  1.  Colonoscopy  2.  Laboratory studies as above  3.  Encourage patient to focus is much as possible on improved diet and exercise.  4.  Patient otherwise should be seen yearly      Patient has been advised of split billing requirements and indicates understanding: Yes    COUNSELING:   Reviewed preventive health counseling, as reflected in patient instructions       Regular exercise       Healthy diet/nutrition    Estimated body mass index is 26.54 kg/m  as calculated from the following:    Height as of this encounter: 1.651 m (5' 5\").    Weight as of 3/2/21: 72.3 kg (159 lb 8 oz).         He reports that he quit smoking about 18 years ago. His smoking use included cigarettes and cigarettes. He has never used smokeless tobacco.      Counseling Resources:  ATP IV Guidelines  Pooled Cohorts Equation Calculator  FRAX Risk Assessment  ICSI Preventive Guidelines  Dietary Guidelines for Americans, 2010  USDA's MyPlate  ASA Prophylaxis  Lung CA Screening    Loy Powell MD  [facility]  "

## 2022-06-21 NOTE — LETTER
June 21, 2022      Trini Salter  [address] [facility] [id number]        Dear ,    We are writing to inform you of your test results.  The A1c is 5.8, this is in the range of prediabetes, but actually little better than it was last time, I want you to continue to focus on good diet and exercise to help keep sugar low.  Liver and kidney tests are normal.  Cholesterol is elevated actually though is better than it has been in the past therefore again good diet and exercise can help lower the cholesterol as well.      Resulted Orders   Comprehensive metabolic panel   Result Value Ref Range    Sodium 140 136 - 145 mmol/L    Potassium 4.0 3.5 - 5.0 mmol/L    Chloride 106 98 - 107 mmol/L    Carbon Dioxide (CO2) 26 22 - 31 mmol/L    Anion Gap 8 5 - 18 mmol/L    Urea Nitrogen 17 8 - 22 mg/dL    Creatinine 0.89 0.70 - 1.30 mg/dL    Calcium 9.2 8.5 - 10.5 mg/dL    Glucose 106 70 - 125 mg/dL    Alkaline Phosphatase 54 45 - 120 U/L    AST 33 0 - 40 U/L    ALT 38 0 - 45 U/L    Protein Total 6.8 6.0 - 8.0 g/dL    Albumin 3.9 3.5 - 5.0 g/dL    Bilirubin Total 0.7 0.0 - 1.0 mg/dL    GFR Estimate >90 >60 mL/min/1.73m2      Comment:      Effective December 21, 2021 eGFRcr in adults is calculated using the 2021 CKD-EPI creatinine equation which includes age and gender (Jolly victoria al., NEJ, DOI: 10.1056/YEGFdm3739373)   Lipid panel reflex to direct LDL Fasting   Result Value Ref Range    Cholesterol 223 (H) <=199 mg/dL    Triglycerides 124 <=149 mg/dL    Direct Measure HDL 46 >=40 mg/dL      Comment:      HDL Cholesterol Reference Range:     0-2 years:   No reference ranges established for patients under 2 years old  at [facility] for lipid analytes.    2-8 years:  Greater than 45 mg/dL     18 years and older:   Female: Greater than or equal to 50 mg/dL   Male:   Greater than or equal to 40 mg/dL    LDL Cholesterol Calculated 152 (H) <=129 mg/dL    Patient Fasting > 8hrs? Yes    Hemoglobin A1c   Result Value Ref  Range    Hemoglobin A1C 5.8 (H) 0.0 - 5.6 %      Comment:      Normal <5.7%   Prediabetes 5.7-6.4%    Diabetes 6.5% or higher     Note: Adopted from ADA consensus guidelines.       If you have any questions or concerns, please call the clinic at the number listed above.       Sincerely,      Loy Powell MD

## 2022-09-25 ENCOUNTER — HEALTH MAINTENANCE LETTER (OUTPATIENT)
Age: 52
End: 2022-09-25

## 2022-11-03 ENCOUNTER — TRANSFERRED RECORDS (OUTPATIENT)
Dept: HEALTH INFORMATION MANAGEMENT | Facility: CLINIC | Age: 52
End: 2022-11-03

## 2022-12-07 ENCOUNTER — TRANSFERRED RECORDS (OUTPATIENT)
Dept: HEALTH INFORMATION MANAGEMENT | Facility: CLINIC | Age: 52
End: 2022-12-07

## 2022-12-20 ENCOUNTER — TRANSFERRED RECORDS (OUTPATIENT)
Dept: HEALTH INFORMATION MANAGEMENT | Facility: CLINIC | Age: 52
End: 2022-12-20

## 2023-05-22 ENCOUNTER — PATIENT OUTREACH (OUTPATIENT)
Dept: CARE COORDINATION | Facility: CLINIC | Age: 53
End: 2023-05-22
Payer: COMMERCIAL

## 2023-06-06 ENCOUNTER — PATIENT OUTREACH (OUTPATIENT)
Dept: CARE COORDINATION | Facility: CLINIC | Age: 53
End: 2023-06-06
Payer: COMMERCIAL

## 2023-06-26 ENCOUNTER — OFFICE VISIT (OUTPATIENT)
Dept: FAMILY MEDICINE | Facility: CLINIC | Age: 53
End: 2023-06-26
Payer: COMMERCIAL

## 2023-06-26 VITALS
HEIGHT: 65 IN | TEMPERATURE: 97.1 F | WEIGHT: 151 LBS | DIASTOLIC BLOOD PRESSURE: 66 MMHG | OXYGEN SATURATION: 98 % | HEART RATE: 54 BPM | BODY MASS INDEX: 25.16 KG/M2 | SYSTOLIC BLOOD PRESSURE: 120 MMHG

## 2023-06-26 DIAGNOSIS — Z23 NEED FOR VACCINATION: ICD-10-CM

## 2023-06-26 DIAGNOSIS — R73.03 PREDIABETES: ICD-10-CM

## 2023-06-26 DIAGNOSIS — Z00.00 PHYSICAL EXAM: Primary | ICD-10-CM

## 2023-06-26 DIAGNOSIS — E78.49 OTHER HYPERLIPIDEMIA: ICD-10-CM

## 2023-06-26 DIAGNOSIS — L73.9 FOLLICULITIS: ICD-10-CM

## 2023-06-26 LAB
ALBUMIN SERPL BCG-MCNC: 4.8 G/DL (ref 3.5–5.2)
ALP SERPL-CCNC: 61 U/L (ref 40–129)
ALT SERPL W P-5'-P-CCNC: 49 U/L (ref 0–70)
ANION GAP SERPL CALCULATED.3IONS-SCNC: 11 MMOL/L (ref 7–15)
AST SERPL W P-5'-P-CCNC: 48 U/L (ref 0–45)
BILIRUB SERPL-MCNC: 0.6 MG/DL
BUN SERPL-MCNC: 15.2 MG/DL (ref 6–20)
CALCIUM SERPL-MCNC: 10 MG/DL (ref 8.6–10)
CHLORIDE SERPL-SCNC: 102 MMOL/L (ref 98–107)
CHOLEST SERPL-MCNC: 241 MG/DL
CREAT SERPL-MCNC: 0.91 MG/DL (ref 0.67–1.17)
DEPRECATED HCO3 PLAS-SCNC: 27 MMOL/L (ref 22–29)
GFR SERPL CREATININE-BSD FRML MDRD: >90 ML/MIN/1.73M2
GLUCOSE SERPL-MCNC: 110 MG/DL (ref 70–99)
HBA1C MFR BLD: 6 % (ref 0–5.6)
HDLC SERPL-MCNC: 45 MG/DL
LDLC SERPL CALC-MCNC: 165 MG/DL
NONHDLC SERPL-MCNC: 196 MG/DL
POTASSIUM SERPL-SCNC: 4.7 MMOL/L (ref 3.4–5.3)
PROT SERPL-MCNC: 7.3 G/DL (ref 6.4–8.3)
SODIUM SERPL-SCNC: 140 MMOL/L (ref 136–145)
TRIGL SERPL-MCNC: 154 MG/DL

## 2023-06-26 PROCEDURE — 99396 PREV VISIT EST AGE 40-64: CPT | Mod: 25 | Performed by: FAMILY MEDICINE

## 2023-06-26 PROCEDURE — 36415 COLL VENOUS BLD VENIPUNCTURE: CPT | Performed by: FAMILY MEDICINE

## 2023-06-26 PROCEDURE — 80061 LIPID PANEL: CPT | Performed by: FAMILY MEDICINE

## 2023-06-26 PROCEDURE — 80053 COMPREHEN METABOLIC PANEL: CPT | Performed by: FAMILY MEDICINE

## 2023-06-26 PROCEDURE — 99214 OFFICE O/P EST MOD 30 MIN: CPT | Mod: 25 | Performed by: FAMILY MEDICINE

## 2023-06-26 PROCEDURE — 90715 TDAP VACCINE 7 YRS/> IM: CPT | Performed by: FAMILY MEDICINE

## 2023-06-26 PROCEDURE — 90471 IMMUNIZATION ADMIN: CPT | Performed by: FAMILY MEDICINE

## 2023-06-26 PROCEDURE — 83036 HEMOGLOBIN GLYCOSYLATED A1C: CPT | Performed by: FAMILY MEDICINE

## 2023-06-26 ASSESSMENT — ENCOUNTER SYMPTOMS
HEARTBURN: 0
WEAKNESS: 0
PARESTHESIAS: 0
HEADACHES: 0
ARTHRALGIAS: 0
COUGH: 0
NAUSEA: 0
SORE THROAT: 0
PALPITATIONS: 0
CHILLS: 0
ABDOMINAL PAIN: 0
FEVER: 0
HEMATURIA: 0
EYE PAIN: 0
SHORTNESS OF BREATH: 0
DIZZINESS: 0
FREQUENCY: 0
HEMATOCHEZIA: 0
MYALGIAS: 0
DYSURIA: 0
JOINT SWELLING: 0
NERVOUS/ANXIOUS: 0
DIARRHEA: 0
CONSTIPATION: 0

## 2023-06-26 NOTE — PROGRESS NOTES
SUBJECTIVE:   CC: Trini is an 52 year old who presents for preventative health visit.       6/26/2023     8:28 AM   Additional Questions   Roomed by Gabino DOW     HPI:   Patient comes in for his annual physical examination.  The patient has been prediabetic of note he is trying to eat a little bit less white rice generally eats an Thai type diet, he has been historically physically active, there is not a family history of type 2 diabetes mellitus.    Patient also has a history of hyperlipidemia, there is some family history of this, attempting to control this with lifestyle.    The patient has a history of folliculitis, followed regularly by dermatology he is on daily amoxicillin which is his only medication.    Overall the patient does have good health habits he needs a tetanus booster today up-to-date with other immunizations of note he is up-to-date with colorectal cancer screening.    Otherwise no other significant change in his health status he has noticed however that he is not seen as well and uses reading glasses at least on occasion and not hearing as well though this is not interfering with function.        Healthy Habits:     Getting at least 3 servings of Calcium per day:  NO    Bi-annual eye exam:  Yes    Dental care twice a year:  Yes    Sleep apnea or symptoms of sleep apnea:  None    Diet:  Regular (no restrictions)    Frequency of exercise:  4-5 days/week    Duration of exercise:  15-30 minutes    Taking medications regularly:  Yes    Medication side effects:  None    PHQ-2 Total Score: 0    Additional concerns today:  No      Today's PHQ-2 Score:       6/26/2023     8:21 AM   PHQ-2 ( 1999 Pfizer)   Q1: Little interest or pleasure in doing things 0   Q2: Feeling down, depressed or hopeless 0   PHQ-2 Score 0   Q1: Little interest or pleasure in doing things Not at all   Q2: Feeling down, depressed or hopeless Not at all   PHQ-2 Score 0           Social History     Tobacco Use     Smoking status: Former      Types: Cigarettes     Quit date: 9/10/2003     Years since quittin.8     Smokeless tobacco: Never     Tobacco comments:     Light   Substance Use Topics     Alcohol use: No     Comment: Alcoholic Drinks/day:                2023     8:21 AM   Alcohol Use   Prescreen: >3 drinks/day or >7 drinks/week? No          No data to display                Last PSA:   Prostate Specific Antigen Screen   Date Value Ref Range Status   2021 1.5 0.0 - 2.5 ng/mL Final       Reviewed orders with patient. Reviewed health maintenance and updated orders accordingly - Yes  Lab work is in process    Reviewed and updated as needed this visit by clinical staff     Meds              Reviewed and updated as needed this visit by Provider                 History reviewed. No pertinent past medical history.   History reviewed. No pertinent surgical history.    Review of Systems   Constitutional: Negative for chills and fever.   HENT: Positive for hearing loss. Negative for congestion, ear pain and sore throat.    Eyes: Positive for visual disturbance. Negative for pain.   Respiratory: Negative for cough and shortness of breath.    Cardiovascular: Negative for chest pain, palpitations and peripheral edema.   Gastrointestinal: Negative for abdominal pain, constipation, diarrhea, heartburn, hematochezia and nausea.   Genitourinary: Negative for dysuria, frequency, genital sores, hematuria and urgency.   Musculoskeletal: Negative for arthralgias, joint swelling and myalgias.   Skin: Negative for rash.   Neurological: Negative for dizziness, weakness, headaches and paresthesias.   Psychiatric/Behavioral: Negative for mood changes. The patient is not nervous/anxious.      CONSTITUTIONAL: NEGATIVE for fever, chills, change in weight  INTEGUMENTARY/SKIN: NEGATIVE for worrisome rashes, moles or lesions  EYES: NEGATIVE for vision changes or irritation  ENT: NEGATIVE for ear, mouth and throat problems  RESP: NEGATIVE for significant  "cough or SOB  CV: NEGATIVE for chest pain, palpitations or peripheral edema  GI: NEGATIVE for nausea, abdominal pain, heartburn, or change in bowel habits   male: negative for dysuria, hematuria, decreased urinary stream, erectile dysfunction, urethral discharge  MUSCULOSKELETAL: NEGATIVE for significant arthralgias or myalgia  NEURO: NEGATIVE for weakness, dizziness or paresthesias  PSYCHIATRIC: NEGATIVE for changes in mood or affect    OBJECTIVE:   /66 (BP Location: Left arm, Patient Position: Sitting, Cuff Size: Adult Regular)   Pulse 54   Temp 97.1  F (36.2  C) (Temporal)   Ht 1.651 m (5' 5\")   Wt 68.5 kg (151 lb)   SpO2 98%   BMI 25.13 kg/m      Physical Exam  GENERAL: healthy, alert and no distress  EYES: Eyes grossly normal to inspection, PERRL and conjunctivae and sclerae normal  HENT: ear canals and TM's normal, nose and mouth without ulcers or lesions  NECK: no adenopathy, no asymmetry, masses, or scars and thyroid normal to palpation  RESP: lungs clear to auscultation - no rales, rhonchi or wheezes  CV: regular rate and rhythm, normal S1 S2, no S3 or S4, no murmur, click or rub, no peripheral edema and peripheral pulses strong  ABDOMEN: soft, nontender, no hepatosplenomegaly, no masses and bowel sounds normal  MS: no gross musculoskeletal defects noted, no edema  SKIN: no suspicious lesions or rashes  NEURO: Normal strength and tone, mentation intact and speech normal  PSYCH: mentation appears normal, affect normal/bright    Diagnostic Test Results:  Labs reviewed in Epic    ASSESSMENT/PLAN:   (Z00.00) Physical exam  (primary encounter diagnosis)  Comment: Overall the patient has very good health habits and is in good health  Plan: PRIMARY CARE FOLLOW-UP SCHEDULING             (E78.49) Hyperlipidemia  Comment: Elevated, attempting to control with lifestyle  Plan: Comprehensive metabolic panel, Lipid panel         reflex to direct LDL Fasting             (R73.03) Prediabetes  Comment: Noted " previously  Plan: Hemoglobin A1c             (Z23) Need for vaccination  Comment:    Plan: TDAP VACCINE (Adacel, Boostrix)             (L73.9) Folliculitis  Comment: On daily amoxicillin followed by dermatology  Plan:      PLAN:  1.  Tdap  2.  Laboratory studies as above  3.  Patient is followed regularly by dermatology  4.  Patient otherwise should be seen yearly    Patient has been advised of split billing requirements and indicates understanding: Yes      COUNSELING:   Reviewed preventive health counseling, as reflected in patient instructions       Regular exercise       Healthy diet/nutrition        He reports that he quit smoking about 19 years ago. His smoking use included cigarettes and cigarettes. He has never used smokeless tobacco.         Loy Powell MD  [facility]

## 2023-06-26 NOTE — LETTER
June 27, 2023      Trini Salter  [address] [facility] [address]        Dear ,    We are writing to inform you of your test results.  Sugar is slightly high at 110 and another blood test called A1c slightly elevated at 6.0, these are consistent with prediabetes this puts you at risk to develop diabetes but focus on good diet and exercise to help keep sugar low.  Liver and kidney tests are otherwise normal  Cholesterol is elevated, actually has been higher in the past, again good diet and exercise will help keep cholesterol low.    See us again in 1 year    Resulted Orders   Comprehensive metabolic panel   Result Value Ref Range    Sodium 140 136 - 145 mmol/L    Potassium 4.7 3.4 - 5.3 mmol/L    Chloride 102 98 - 107 mmol/L    Carbon Dioxide (CO2) 27 22 - 29 mmol/L    Anion Gap 11 7 - 15 mmol/L    Urea Nitrogen 15.2 6.0 - 20.0 mg/dL    Creatinine 0.91 0.67 - 1.17 mg/dL    Calcium 10.0 8.6 - 10.0 mg/dL    Glucose 110 (H) 70 - 99 mg/dL    Alkaline Phosphatase 61 40 - 129 U/L    AST 48 (H) 0 - 45 U/L      Comment:      Reference intervals for this test were updated on 6/12/2023 to more accurately reflect our healthy population. There may be differences in the flagging of prior results with similar values performed with this method. Interpretation of those prior results can be made in the context of the updated reference intervals.    ALT 49 0 - 70 U/L      Comment:      Reference intervals for this test were updated on 6/12/2023 to more accurately reflect our healthy population. There may be differences in the flagging of prior results with similar values performed with this method. Interpretation of those prior results can be made in the context of the updated reference intervals.      Protein Total 7.3 6.4 - 8.3 g/dL    Albumin 4.8 3.5 - 5.2 g/dL    Bilirubin Total 0.6 <=1.2 mg/dL    GFR Estimate >90 >60 mL/min/1.73m2   Lipid panel reflex to direct LDL Fasting   Result Value Ref Range    Cholesterol 241 (H)  <200 mg/dL    Triglycerides 154 (H) <150 mg/dL    Direct Measure HDL 45 >=40 mg/dL    LDL Cholesterol Calculated 165 (H) <=100 mg/dL    Non HDL Cholesterol 196 (H) <130 mg/dL    Narrative    Cholesterol  Desirable:  <200 mg/dL    Triglycerides  Normal:  Less than 150 mg/dL  Borderline High:  150-199 mg/dL  High:  200-499 mg/dL  Very High:  Greater than or equal to 500 mg/dL    Direct Measure HDL  Female:  Greater than or equal to 50 mg/dL   Male:  Greater than or equal to 40 mg/dL    LDL Cholesterol  Desirable:  <100mg/dL  Above Desirable:  100-129 mg/dL   Borderline High:  130-159 mg/dL   High:  160-189 mg/dL   Very High:  >= 190 mg/dL    Non HDL Cholesterol  Desirable:  130 mg/dL  Above Desirable:  130-159 mg/dL  Borderline High:  160-189 mg/dL  High:  190-219 mg/dL  Very High:  Greater than or equal to 220 mg/dL   Hemoglobin A1c   Result Value Ref Range    Hemoglobin A1C 6.0 (H) 0.0 - 5.6 %      Comment:      Normal <5.7%   Prediabetes 5.7-6.4%    Diabetes 6.5% or higher     Note: Adopted from ADA consensus guidelines.       If you have any questions or concerns, please call the clinic at the number listed above.       Sincerely,      Loy Powell MD

## 2024-03-07 ENCOUNTER — TRANSFERRED RECORDS (OUTPATIENT)
Dept: HEALTH INFORMATION MANAGEMENT | Facility: CLINIC | Age: 54
End: 2024-03-07
Payer: COMMERCIAL

## 2024-03-11 ENCOUNTER — TRANSFERRED RECORDS (OUTPATIENT)
Dept: HEALTH INFORMATION MANAGEMENT | Facility: CLINIC | Age: 54
End: 2024-03-11
Payer: COMMERCIAL

## 2024-03-11 LAB — GLUCOSE (EXTERNAL): 90 MG/DL (ref 70–99)

## 2024-03-12 ENCOUNTER — MEDICAL CORRESPONDENCE (OUTPATIENT)
Dept: SCHEDULING | Facility: CLINIC | Age: 54
End: 2024-03-12
Payer: COMMERCIAL

## 2024-03-12 ENCOUNTER — TRANSFERRED RECORDS (OUTPATIENT)
Dept: HEALTH INFORMATION MANAGEMENT | Facility: CLINIC | Age: 54
End: 2024-03-12
Payer: COMMERCIAL

## 2024-03-12 LAB
ALT SERPL-CCNC: 52 IU/L (ref 0–44)
AST SERPL-CCNC: 49 IU/L (ref 0–40)
CREATININE (EXTERNAL): 0.78 MG/DL (ref 0.76–1.27)
GFR ESTIMATED (EXTERNAL): 108 ML/MIN/1.73
POTASSIUM (EXTERNAL): 4.1 MMOL/L (ref 3.5–5.2)

## 2024-03-14 ENCOUNTER — HOSPITAL ENCOUNTER (OUTPATIENT)
Dept: ULTRASOUND IMAGING | Facility: CLINIC | Age: 54
Discharge: HOME | End: 2024-03-14
Attending: NURSE PRACTITIONER | Admitting: NURSE PRACTITIONER
Payer: COMMERCIAL

## 2024-03-14 DIAGNOSIS — R10.13 EPIGASTRIC PAIN: ICD-10-CM

## 2024-03-14 PROCEDURE — 76705 ECHO EXAM OF ABDOMEN: CPT

## 2024-04-03 ENCOUNTER — OFFICE VISIT (OUTPATIENT)
Dept: FAMILY MEDICINE | Facility: CLINIC | Age: 54
End: 2024-04-03
Payer: COMMERCIAL

## 2024-04-03 VITALS
DIASTOLIC BLOOD PRESSURE: 68 MMHG | OXYGEN SATURATION: 99 % | SYSTOLIC BLOOD PRESSURE: 102 MMHG | HEIGHT: 65 IN | BODY MASS INDEX: 25.66 KG/M2 | TEMPERATURE: 98.1 F | HEART RATE: 70 BPM | WEIGHT: 154 LBS

## 2024-04-03 DIAGNOSIS — E78.49 OTHER HYPERLIPIDEMIA: ICD-10-CM

## 2024-04-03 DIAGNOSIS — K80.20 GALLSTONES: ICD-10-CM

## 2024-04-03 DIAGNOSIS — Z01.818 PREOP EXAMINATION: Primary | ICD-10-CM

## 2024-04-03 DIAGNOSIS — R73.03 PREDIABETES: ICD-10-CM

## 2024-04-03 DIAGNOSIS — L73.9 FOLLICULITIS: ICD-10-CM

## 2024-04-03 LAB
ALBUMIN SERPL BCG-MCNC: 4.8 G/DL (ref 3.5–5.2)
ALP SERPL-CCNC: 63 U/L (ref 40–150)
ALT SERPL W P-5'-P-CCNC: 51 U/L (ref 0–70)
ANION GAP SERPL CALCULATED.3IONS-SCNC: 11 MMOL/L (ref 7–15)
AST SERPL W P-5'-P-CCNC: 42 U/L (ref 0–45)
BILIRUB SERPL-MCNC: 0.6 MG/DL
BUN SERPL-MCNC: 15.4 MG/DL (ref 6–20)
CALCIUM SERPL-MCNC: 10.2 MG/DL (ref 8.6–10)
CHLORIDE SERPL-SCNC: 102 MMOL/L (ref 98–107)
CREAT SERPL-MCNC: 0.94 MG/DL (ref 0.67–1.17)
DEPRECATED HCO3 PLAS-SCNC: 28 MMOL/L (ref 22–29)
EGFRCR SERPLBLD CKD-EPI 2021: >90 ML/MIN/1.73M2
ERYTHROCYTE [DISTWIDTH] IN BLOOD BY AUTOMATED COUNT: 14.1 % (ref 10–15)
GLUCOSE SERPL-MCNC: 114 MG/DL (ref 70–99)
HBA1C MFR BLD: 6 % (ref 0–5.6)
HCT VFR BLD AUTO: 44.8 % (ref 40–53)
HGB BLD-MCNC: 15.1 G/DL (ref 13.3–17.7)
LIPASE SERPL-CCNC: 80 U/L (ref 13–60)
MCH RBC QN AUTO: 29.8 PG (ref 26.5–33)
MCHC RBC AUTO-ENTMCNC: 33.7 G/DL (ref 31.5–36.5)
MCV RBC AUTO: 89 FL (ref 78–100)
PLATELET # BLD AUTO: 233 10E3/UL (ref 150–450)
POTASSIUM SERPL-SCNC: 4.7 MMOL/L (ref 3.4–5.3)
PROT SERPL-MCNC: 7.5 G/DL (ref 6.4–8.3)
RBC # BLD AUTO: 5.06 10E6/UL (ref 4.4–5.9)
SODIUM SERPL-SCNC: 141 MMOL/L (ref 135–145)
WBC # BLD AUTO: 5 10E3/UL (ref 4–11)

## 2024-04-03 PROCEDURE — 85027 COMPLETE CBC AUTOMATED: CPT | Performed by: FAMILY MEDICINE

## 2024-04-03 PROCEDURE — 83036 HEMOGLOBIN GLYCOSYLATED A1C: CPT | Performed by: FAMILY MEDICINE

## 2024-04-03 PROCEDURE — 99214 OFFICE O/P EST MOD 30 MIN: CPT | Performed by: FAMILY MEDICINE

## 2024-04-03 PROCEDURE — 80053 COMPREHEN METABOLIC PANEL: CPT | Performed by: FAMILY MEDICINE

## 2024-04-03 PROCEDURE — 36415 COLL VENOUS BLD VENIPUNCTURE: CPT | Performed by: FAMILY MEDICINE

## 2024-04-03 PROCEDURE — 83690 ASSAY OF LIPASE: CPT | Performed by: FAMILY MEDICINE

## 2024-04-03 NOTE — LETTER
April 4, 2024      Trini Salter  [address] [facility] [address]        Dear ,    We are writing to inform you of your test results.Liver and kidney tests are essentially normal.  Lipase, which is a marker for pancrease inflammation is just slightly elevated but probably not significant.  Blood counts are normal, you are not anemic.  The A1C is stable at 6.0, you do have prediabetes, but focus on good diet and exercise to hekp keep low.    See us for a physical later in the year.           Resulted Orders   Comprehensive metabolic panel   Result Value Ref Range    Sodium 141 135 - 145 mmol/L      Comment:      Reference intervals for this test were updated on 09/26/2023 to more accurately reflect our healthy population. There may be differences in the flagging of prior results with similar values performed with this method. Interpretation of those prior results can be made in the context of the updated reference intervals.     Potassium 4.7 3.4 - 5.3 mmol/L    Carbon Dioxide (CO2) 28 22 - 29 mmol/L    Anion Gap 11 7 - 15 mmol/L    Urea Nitrogen 15.4 6.0 - 20.0 mg/dL    Creatinine 0.94 0.67 - 1.17 mg/dL    GFR Estimate >90 >60 mL/min/1.73m2    Calcium 10.2 (H) 8.6 - 10.0 mg/dL    Chloride 102 98 - 107 mmol/L    Glucose 114 (H) 70 - 99 mg/dL    Alkaline Phosphatase 63 40 - 150 U/L      Comment:      Reference intervals for this test were updated on 11/14/2023 to more accurately reflect our healthy population. There may be differences in the flagging of prior results with similar values performed with this method. Interpretation of those prior results can be made in the context of the updated reference intervals.    AST 42 0 - 45 U/L      Comment:      Reference intervals for this test were updated on 6/12/2023 to more accurately reflect our healthy population. There may be differences in the flagging of prior results with similar values performed with this method. Interpretation of those prior results can be  made in the context of the updated reference intervals.    ALT 51 0 - 70 U/L      Comment:      Reference intervals for this test were updated on 6/12/2023 to more accurately reflect our healthy population. There may be differences in the flagging of prior results with similar values performed with this method. Interpretation of those prior results can be made in the context of the updated reference intervals.      Protein Total 7.5 6.4 - 8.3 g/dL    Albumin 4.8 3.5 - 5.2 g/dL    Bilirubin Total 0.6 <=1.2 mg/dL   CBC with platelets   Result Value Ref Range    WBC Count 5.0 4.0 - 11.0 10e3/uL    RBC Count 5.06 4.40 - 5.90 10e6/uL    Hemoglobin 15.1 13.3 - 17.7 g/dL    Hematocrit 44.8 40.0 - 53.0 %    MCV 89 78 - 100 fL    MCH 29.8 26.5 - 33.0 pg    MCHC 33.7 31.5 - 36.5 g/dL    RDW 14.1 10.0 - 15.0 %    Platelet Count 233 150 - 450 10e3/uL   Lipase   Result Value Ref Range    Lipase 80 (H) 13 - 60 U/L   Hemoglobin A1c   Result Value Ref Range    Hemoglobin A1C 6.0 (H) 0.0 - 5.6 %      Comment:      Normal <5.7%   Prediabetes 5.7-6.4%    Diabetes 6.5% or higher     Note: Adopted from ADA consensus guidelines.       If you have any questions or concerns, please call the clinic at the number listed above.       Sincerely,      Loy Powell MD

## 2024-04-03 NOTE — PROGRESS NOTES
Preoperative Evaluation  [facility]  [address] [facility] [address]  Phone: [phone]  Fax: [phone]  Primary Provider: Loy Stafford  Pre-op Performing Provider: LOY STAFFORD  Apr 3, 2024         Trini is a 53 year old, presenting for the following:  Pre-Op Exam        4/3/2024     9:59 AM   Additional Questions   Roomed by Priya BINGHAM     Surgical Information  Surgery/Procedure: ENDOSCOPIC RETROGRADE CHOLANGIOPANCREATOGRAPHY     Surgery Location: [facility]    Surgeon: Dmitriy Gan MD     Surgery Date: 4/26/2024    Time of Surgery: 9:25 AM    Where patient plans to recover: At home with family    Fax number for surgical facility: Note does not need to be faxed, will be available electronically in Epic.    Assessment & Plan     The proposed surgical procedure is considered LOW risk.    (Z01.818) Preop examination  (primary encounter diagnosis)  Comment: For ERCP as above  Plan: Comprehensive metabolic panel, CBC with         platelets, Lipase             (L73.9) Folliculitis  Comment: Chronic longstanding on amoxicillin  Plan:      (R73.03) Prediabetes  Comment: A1c at 6.0  Plan: Hemoglobin A1c             (E78.49) Hyperlipidemia  Comment: Elevated, attempting to control with lifestyle  Plan:      (K80.20) Gallstones  Comment: Gallstones noted on ultrasound  Plan: Comprehensive metabolic panel, CBC with         platelets, Lipase             PLAN:  1.  Comprehensive metabolic profile CBC lipase and A1c reviewed, results essentially normal, see below.   2.  Patient is otherwise cleared for surgery  3.  Patient is due for a physical later in the year.              - No identified additional risk factors other than previously addressed    Antiplatelet or Anticoagulation Medication Instructions   - Patient is on no antiplatelet or anticoagulation medications.    Additional Medication Instructions  Patient is to take all scheduled medications on  the day of surgery    Recommendation  APPROVAL GIVEN to proceed with proposed procedure, without further diagnostic evaluation.          Subjective       HPI related to upcoming procedure: The patient is scheduled for the above procedure, the patient is scheduled for the above procedure, since January he has had 2 episodes of epigastric pain, and ultrasound reveals multiple gallstones, and enlargement of the extrahepatic duct, there may be a common bile duct stone, in either case patient is scheduled for the above procedure.    Other than those 2 episodes of pain the patient does not have ongoing pain or discomfort.    Patient has a history of folliculitis he is on amoxicillin for this.    Has been noted to be prediabetic.    Patient has no history of any surgical procedures in the past, overall his health is good blood pressure is well-controlled he has very good health habits and no other change in his health status.    He will bring in a healthcare directive in the future.              4/3/2024     9:55 AM   Preop Questions   1. Have you ever had a heart attack or stroke? No   2. Have you ever had surgery on your heart or blood vessels, such as a stent placement, a coronary artery bypass, or surgery on an artery in your head, neck, heart, or legs? No   3. Do you have chest pain with activity? No   4. Do you have a history of  heart failure? No   5. Do you currently have a cold, bronchitis or symptoms of other infection? No   6. Do you have a cough, shortness of breath, or wheezing? No   7. Do you or anyone in your family have previous history of blood clots? No   8. Do you or does anyone in your family have a serious bleeding problem such as prolonged bleeding following surgeries or cuts? No   9. Have you ever had problems with anemia or been told to take iron pills? No   10. Have you had any abnormal blood loss such as black, tarry or bloody stools? No   11. Have you ever had a blood transfusion? No   12. Are  you willing to have a blood transfusion if it is medically needed before, during, or after your surgery? Yes   13. Have you or any of your relatives ever had problems with anesthesia? No   14. Do you have sleep apnea, excessive snoring or daytime drowsiness? No   15. Do you have any artifical heart valves or other implanted medical devices like a pacemaker, defibrillator, or continuous glucose monitor? No   16. Do you have artificial joints? No   17. Are you allergic to latex? No       Health Care Directive  Patient does not have a Health Care Directive or Living Will: Discussed advance care planning with patient; information given to patient to review.    Preoperative Review of    reviewed - no record of controlled substances prescribed.        Status of Chronic Conditions:  See problem list for active medical problems.  Problems all longstanding and stable, except as noted/documented.  See ROS for pertinent symptoms related to these conditions.    Patient Active Problem List    Diagnosis Date Noted    Prediabetes 2022     Priority: Medium     Diagnosed   A1c 6.0      Hyperlipidemia      Priority: Medium     Dx   LDL to 181; low HDL, triglycerides to 275        Folliculitis 2018     Priority: Medium     Amoxicillin daily  Dermatology        External hemorrhoids      Priority: Medium     Miralax          No past medical history on file.  No past surgical history on file.  Current Outpatient Medications   Medication Sig Dispense Refill    amoxicillin (AMOXIL) 500 MG tablet [AMOXICILLIN (AMOXIL) 500 MG TABLET] Take 1 tablet (500 mg total) by mouth daily. 90 tablet 0    MULTIVITAMIN ORAL [MULTIVITAMIN ORAL] Take by mouth daily.         No Known Allergies     Social History     Tobacco Use    Smoking status: Former     Packs/day: .25     Types: Cigarettes     Quit date: 9/10/2003     Years since quittin.5    Smokeless tobacco: Never    Tobacco comments:     Light   Substance Use Topics     "Alcohol use: No     Comment: Alcoholic Drinks/day:       Family History   Problem Relation Age of Onset    Cancer Mother         Ovarian    Parkinsonism Father     Hypotension Father     Benign prostatic hyperplasia Father     Hyperlipidemia Brother     Dementia Paternal Grandfather     Parkinsonism Paternal Grandfather      History   Drug Use No         Review of Systems    Review of Systems  Constitutional, HEENT, cardiovascular, pulmonary, GI, , musculoskeletal, neuro, skin, endocrine and psych systems are negative, except as otherwise noted.    Objective    /68   Pulse 70   Temp 98.1  F (36.7  C)   Ht 1.651 m (5' 5\")   Wt 69.9 kg (154 lb)   SpO2 99%   BMI 25.63 kg/m     Estimated body mass index is 25.63 kg/m  as calculated from the following:    Height as of this encounter: 1.651 m (5' 5\").    Weight as of this encounter: 69.9 kg (154 lb).  Physical Exam  GENERAL: alert and no distress  EYES: Eyes grossly normal to inspection, PERRL and conjunctivae and sclerae normal  HENT: ear canals and TM's normal, nose and mouth without ulcers or lesions  NECK: no adenopathy, no asymmetry, masses, or scars  RESP: lungs clear to auscultation - no rales, rhonchi or wheezes  CV: regular rate and rhythm, normal S1 S2, no S3 or S4, no murmur, click or rub, no peripheral edema  ABDOMEN: soft, nontender, no hepatosplenomegaly, no masses and bowel sounds normal  MS: no gross musculoskeletal defects noted, no edema  SKIN: no suspicious lesions or rashes  NEURO: Normal strength and tone, mentation intact and speech normal  PSYCH: mentation appears normal, affect normal/bright    Recent Labs   Lab Test 06/26/23  0859 06/21/22  0912    140   POTASSIUM 4.7 4.0   CR 0.91 0.89   A1C 6.0* 5.8*        Diagnostics  Recent Results (from the past 48 hour(s))   Comprehensive metabolic panel    Collection Time: 04/03/24 10:32 AM   Result Value Ref Range    Sodium 141 135 - 145 mmol/L    Potassium 4.7 3.4 - 5.3 mmol/L    " Carbon Dioxide (CO2) 28 22 - 29 mmol/L    Anion Gap 11 7 - 15 mmol/L    Urea Nitrogen 15.4 6.0 - 20.0 mg/dL    Creatinine 0.94 0.67 - 1.17 mg/dL    GFR Estimate >90 >60 mL/min/1.73m2    Calcium 10.2 (H) 8.6 - 10.0 mg/dL    Chloride 102 98 - 107 mmol/L    Glucose 114 (H) 70 - 99 mg/dL    Alkaline Phosphatase 63 40 - 150 U/L    AST 42 0 - 45 U/L    ALT 51 0 - 70 U/L    Protein Total 7.5 6.4 - 8.3 g/dL    Albumin 4.8 3.5 - 5.2 g/dL    Bilirubin Total 0.6 <=1.2 mg/dL   CBC with platelets    Collection Time: 04/03/24 10:32 AM   Result Value Ref Range    WBC Count 5.0 4.0 - 11.0 10e3/uL    RBC Count 5.06 4.40 - 5.90 10e6/uL    Hemoglobin 15.1 13.3 - 17.7 g/dL    Hematocrit 44.8 40.0 - 53.0 %    MCV 89 78 - 100 fL    MCH 29.8 26.5 - 33.0 pg    MCHC 33.7 31.5 - 36.5 g/dL    RDW 14.1 10.0 - 15.0 %    Platelet Count 233 150 - 450 10e3/uL   Lipase    Collection Time: 04/03/24 10:32 AM   Result Value Ref Range    Lipase 80 (H) 13 - 60 U/L   Hemoglobin A1c    Collection Time: 04/03/24 10:32 AM   Result Value Ref Range    Hemoglobin A1C 6.0 (H) 0.0 - 5.6 %      No EKG required for low risk surgery (cataract, skin procedure, breast biopsy, etc).  No EKG required, no history of coronary heart disease, significant arrhythmia, peripheral arterial disease or other structural heart disease.    Revised Cardiac Risk Index (RCRI)  The patient has the following serious cardiovascular risks for perioperative complications:   - No serious cardiac risks = 0 points     RCRI Interpretation: 0 points: Class I (very low risk - 0.4% complication rate)         Signed Electronically by: Loy Powell MD  Copy of this evaluation report is provided to requesting physician.

## 2024-04-15 ENCOUNTER — DOCUMENTATION ONLY (OUTPATIENT)
Dept: OTHER | Facility: CLINIC | Age: 54
End: 2024-04-15
Payer: COMMERCIAL

## 2024-04-26 ENCOUNTER — HOSPITAL ENCOUNTER (OUTPATIENT)
Facility: HOSPITAL | Age: 54
Discharge: HOME | End: 2024-04-26
Attending: INTERNAL MEDICINE | Admitting: INTERNAL MEDICINE
Payer: COMMERCIAL

## 2024-04-26 ENCOUNTER — ANESTHESIA EVENT (OUTPATIENT)
Dept: SURGERY | Facility: HOSPITAL | Age: 54
End: 2024-04-26
Payer: COMMERCIAL

## 2024-04-26 ENCOUNTER — ANESTHESIA (OUTPATIENT)
Dept: SURGERY | Facility: HOSPITAL | Age: 54
End: 2024-04-26
Payer: COMMERCIAL

## 2024-04-26 VITALS
SYSTOLIC BLOOD PRESSURE: 101 MMHG | HEART RATE: 71 BPM | TEMPERATURE: 98.3 F | HEIGHT: 65 IN | OXYGEN SATURATION: 97 % | WEIGHT: 150.6 LBS | RESPIRATION RATE: 16 BRPM | DIASTOLIC BLOOD PRESSURE: 62 MMHG | BODY MASS INDEX: 25.09 KG/M2

## 2024-04-26 LAB — UPPER EUS: NORMAL

## 2024-04-26 PROCEDURE — 272N000001 HC OR GENERAL SUPPLY STERILE: Performed by: INTERNAL MEDICINE

## 2024-04-26 PROCEDURE — 370N000017 HC ANESTHESIA TECHNICAL FEE, PER MIN: Performed by: INTERNAL MEDICINE

## 2024-04-26 PROCEDURE — 250N000009 HC RX 250: Performed by: NURSE ANESTHETIST, CERTIFIED REGISTERED

## 2024-04-26 PROCEDURE — 258N000003 HC RX IP 258 OP 636: Performed by: NURSE ANESTHETIST, CERTIFIED REGISTERED

## 2024-04-26 PROCEDURE — 258N000003 HC RX IP 258 OP 636: Performed by: ANESTHESIOLOGY

## 2024-04-26 PROCEDURE — 999N000141 HC STATISTIC PRE-PROCEDURE NURSING ASSESSMENT: Performed by: INTERNAL MEDICINE

## 2024-04-26 PROCEDURE — 710N000012 HC RECOVERY PHASE 2, PER MINUTE: Performed by: INTERNAL MEDICINE

## 2024-04-26 PROCEDURE — 360N000076 HC SURGERY LEVEL 3, PER MIN: Performed by: INTERNAL MEDICINE

## 2024-04-26 PROCEDURE — 250N000011 HC RX IP 250 OP 636: Performed by: NURSE ANESTHETIST, CERTIFIED REGISTERED

## 2024-04-26 RX ORDER — LIDOCAINE HYDROCHLORIDE 10 MG/ML
INJECTION, SOLUTION INFILTRATION; PERINEURAL PRN
Status: DISCONTINUED | OUTPATIENT
Start: 2024-04-26 | End: 2024-04-26

## 2024-04-26 RX ORDER — FENTANYL CITRATE 50 UG/ML
25 INJECTION, SOLUTION INTRAMUSCULAR; INTRAVENOUS
Status: DISCONTINUED | OUTPATIENT
Start: 2024-04-26 | End: 2024-04-26 | Stop reason: HOSPADM

## 2024-04-26 RX ORDER — OXYCODONE HYDROCHLORIDE 5 MG/1
5 TABLET ORAL EVERY 4 HOURS PRN
Status: DISCONTINUED | OUTPATIENT
Start: 2024-04-26 | End: 2024-04-26 | Stop reason: HOSPADM

## 2024-04-26 RX ORDER — INDOMETHACIN 50 MG/1
100 SUPPOSITORY RECTAL
Status: DISCONTINUED | OUTPATIENT
Start: 2024-04-26 | End: 2024-04-26 | Stop reason: HOSPADM

## 2024-04-26 RX ORDER — ACETAMINOPHEN 325 MG/1
975 TABLET ORAL
Status: DISCONTINUED | OUTPATIENT
Start: 2024-04-26 | End: 2024-04-26 | Stop reason: HOSPADM

## 2024-04-26 RX ORDER — PROPOFOL 10 MG/ML
INJECTION, EMULSION INTRAVENOUS PRN
Status: DISCONTINUED | OUTPATIENT
Start: 2024-04-26 | End: 2024-04-26

## 2024-04-26 RX ORDER — SODIUM CHLORIDE, SODIUM LACTATE, POTASSIUM CHLORIDE, CALCIUM CHLORIDE 600; 310; 30; 20 MG/100ML; MG/100ML; MG/100ML; MG/100ML
INJECTION, SOLUTION INTRAVENOUS CONTINUOUS
Status: DISCONTINUED | OUTPATIENT
Start: 2024-04-26 | End: 2024-04-26 | Stop reason: HOSPADM

## 2024-04-26 RX ORDER — ONDANSETRON 2 MG/ML
4 INJECTION INTRAMUSCULAR; INTRAVENOUS EVERY 30 MIN PRN
Status: DISCONTINUED | OUTPATIENT
Start: 2024-04-26 | End: 2024-04-26 | Stop reason: HOSPADM

## 2024-04-26 RX ORDER — LIDOCAINE 40 MG/G
CREAM TOPICAL
Status: DISCONTINUED | OUTPATIENT
Start: 2024-04-26 | End: 2024-04-26 | Stop reason: HOSPADM

## 2024-04-26 RX ORDER — NALOXONE HYDROCHLORIDE 0.4 MG/ML
0.1 INJECTION, SOLUTION INTRAMUSCULAR; INTRAVENOUS; SUBCUTANEOUS
Status: DISCONTINUED | OUTPATIENT
Start: 2024-04-26 | End: 2024-04-26 | Stop reason: HOSPADM

## 2024-04-26 RX ORDER — ONDANSETRON 4 MG/1
4 TABLET, ORALLY DISINTEGRATING ORAL EVERY 30 MIN PRN
Status: DISCONTINUED | OUTPATIENT
Start: 2024-04-26 | End: 2024-04-26 | Stop reason: HOSPADM

## 2024-04-26 RX ORDER — OXYCODONE HYDROCHLORIDE 5 MG/1
10 TABLET ORAL EVERY 4 HOURS PRN
Status: DISCONTINUED | OUTPATIENT
Start: 2024-04-26 | End: 2024-04-26 | Stop reason: HOSPADM

## 2024-04-26 RX ORDER — PROPOFOL 10 MG/ML
INJECTION, EMULSION INTRAVENOUS CONTINUOUS PRN
Status: DISCONTINUED | OUTPATIENT
Start: 2024-04-26 | End: 2024-04-26

## 2024-04-26 RX ADMIN — LIDOCAINE HYDROCHLORIDE 5 ML: 10 INJECTION, SOLUTION INFILTRATION; PERINEURAL at 09:21

## 2024-04-26 RX ADMIN — PROPOFOL 150 MCG/KG/MIN: 10 INJECTION, EMULSION INTRAVENOUS at 09:21

## 2024-04-26 RX ADMIN — PROPOFOL 50 MG: 10 INJECTION, EMULSION INTRAVENOUS at 09:21

## 2024-04-26 RX ADMIN — PHENYLEPHRINE HYDROCHLORIDE 100 MCG: 10 INJECTION INTRAVENOUS at 09:50

## 2024-04-26 RX ADMIN — PHENYLEPHRINE HYDROCHLORIDE 100 MCG: 10 INJECTION INTRAVENOUS at 09:42

## 2024-04-26 RX ADMIN — SODIUM CHLORIDE, POTASSIUM CHLORIDE, SODIUM LACTATE AND CALCIUM CHLORIDE: 600; 310; 30; 20 INJECTION, SOLUTION INTRAVENOUS at 08:37

## 2024-04-26 ASSESSMENT — LIFESTYLE VARIABLES: TOBACCO_USE: 1

## 2024-04-26 ASSESSMENT — ACTIVITIES OF DAILY LIVING (ADL)
ADLS_ACUITY_SCORE: 18

## 2024-04-26 NOTE — H&P
The History and Physical has been reviewed, the patient has been examined and no changes have occurred in the patient's condition since the H & P was completed.     Dmitriy Gan MD  [facility]  [phone]

## 2024-04-26 NOTE — ANESTHESIA POSTPROCEDURE EVALUATION
Patient: Trini Salter    Procedure: Procedure(s):  ENDOSCOPIC ULTRASOUND       Anesthesia Type:  MAC    Note:  Disposition: Outpatient   Postop Pain Control: Uneventful            Sign Out: Well controlled pain   PONV: No   Neuro/Psych: Uneventful            Sign Out: Acceptable/Baseline neuro status   Airway/Respiratory: Uneventful            Sign Out: Acceptable/Baseline resp. status   CV/Hemodynamics: Uneventful            Sign Out: Acceptable CV status; No obvious hypovolemia; No obvious fluid overload   Other NRE: NONE   DID A NON-ROUTINE EVENT OCCUR? No       Last vitals:  Vitals Value Taken Time   /62 04/26/24 1015   Temp 36.8  C (98.3  F) 04/26/24 0950   Pulse 69 04/26/24 1017   Resp 16 04/26/24 0950   SpO2 97 % 04/26/24 1017   Vitals shown include unfiled device data.    Electronically Signed By: Von Parr MD  April 26, 2024  10:19 AM

## 2024-04-26 NOTE — ANESTHESIA CARE TRANSFER NOTE
Patient: Trini Salter    Procedure: Procedure(s):  ENDOSCOPIC ULTRASOUND       Diagnosis: Elevated LFTs [R79.89]  Diagnosis Additional Information: No value filed.    Anesthesia Type:   MAC     Note:    Oropharynx: oropharynx clear of all foreign objects and spontaneously breathing  Level of Consciousness: drowsy  Oxygen Supplementation: room air    Independent Airway: airway patency satisfactory and stable  Dentition: dentition unchanged  Vital Signs Stable: post-procedure vital signs reviewed and stable  Report to RN Given: handoff report given  Patient transferred to: Phase II    Handoff Report: Identifed the Patient, Identified the Reponsible Provider, Reviewed the pertinent medical history, Discussed the surgical course, Reviewed Intra-OP anesthesia mangement and issues during anesthesia, Set expectations for post-procedure period and Allowed opportunity for questions and acknowledgement of understanding      Vitals:  Vitals Value Taken Time   /58 04/26/24 0950   Temp 36.8  C (98.3  F) 04/26/24 0950   Pulse 63 04/26/24 0950   Resp 16 04/26/24 0950   SpO2 97 % 04/26/24 0950       Electronically Signed By: JUSTINO Garcia CRNA  April 26, 2024  9:52 AM

## 2024-04-26 NOTE — ANESTHESIA PREPROCEDURE EVALUATION
Anesthesia Pre-Procedure Evaluation    Patient: Trini Salter   MRN: [id number] : 1970        Procedure : Procedure(s):  ENDOSCOPIC RETROGRADE CHOLANGIOPANCREATOGRAPHY  ENDOSCOPIC ULTRASOUND          Past Medical History:   Diagnosis Date    External hemorrhoids     Folliculitis     Hyperlipidemia       History reviewed. No pertinent surgical history.   No Known Allergies   Social History     Tobacco Use    Smoking status: Former     Current packs/day: 0.00     Types: Cigarettes     Quit date: 9/10/2003     Years since quittin.6    Smokeless tobacco: Never    Tobacco comments:     Light   Substance Use Topics    Alcohol use: No     Comment: Alcoholic Drinks/day:        Wt Readings from Last 1 Encounters:   24 68.3 kg (150 lb 9.6 oz)        Anesthesia Evaluation            ROS/MED HX  ENT/Pulmonary:     (+)                tobacco use, Past use,                       Neurologic:       Cardiovascular:     (+) Dyslipidemia - -   -  - -                                      METS/Exercise Tolerance:     Hematologic:       Musculoskeletal:       GI/Hepatic:       Renal/Genitourinary:       Endo:     (+)  type II DM,                 (-) obesity   Psychiatric/Substance Use:       Infectious Disease:       Malignancy:       Other:          Physical Exam    Airway        Mallampati: I   TM distance: > 3 FB   Neck ROM: full   Mouth opening: > 3 cm    Respiratory Devices and Support         Dental       (+) Completely normal teeth      Cardiovascular   cardiovascular exam normal          Pulmonary   pulmonary exam normal                OUTSIDE LABS:  CBC:   Lab Results   Component Value Date    WBC 5.0 2024    HGB 15.1 2024    HCT 44.8 2024     2024     BMP:   Lab Results   Component Value Date     2024     2023    POTASSIUM 4.7 2024    POTASSIUM 4.7 2023    CHLORIDE 102 2024    CHLORIDE 102 2023    CO2 28 2024    CO2 27  "06/26/2023    BUN 15.4 04/03/2024    BUN 15.2 06/26/2023    CR 0.94 04/03/2024    CR 0.91 06/26/2023     (H) 04/03/2024     (H) 06/26/2023     COAGS: No results found for: \"PTT\", \"INR\", \"FIBR\"  POC: No results found for: \"BGM\", \"HCG\", \"HCGS\"  HEPATIC:   Lab Results   Component Value Date    ALBUMIN 4.8 04/03/2024    PROTTOTAL 7.5 04/03/2024    ALT 51 04/03/2024    AST 42 04/03/2024    ALKPHOS 63 04/03/2024    BILITOTAL 0.6 04/03/2024     OTHER:   Lab Results   Component Value Date    A1C 6.0 (H) 04/03/2024    SAI 10.2 (H) 04/03/2024    LIPASE 80 (H) 04/03/2024       Anesthesia Plan    ASA Status:  2    NPO Status:  NPO Appropriate    Anesthesia Type: MAC.     - Reason for MAC: straight local not clinically adequate      Maintenance: TIVA.        Consents    Anesthesia Plan(s) and associated risks, benefits, and realistic alternatives discussed. Questions answered and patient/representative(s) expressed understanding.     - Discussed:     - Discussed with:  Patient      - Extended Intubation/Ventilatory Support Discussed: No.      - Patient is DNR/DNI Status: No     Use of blood products discussed: No .     Postoperative Care            Comments:    Other Comments: Decadron, Zofran.  Diprivan infusion.  PE, Ephedrine PRN.         Von Parr MD    I have reviewed the pertinent notes and labs in the chart from the past 30 days and (re)examined the patient.  Any updates or changes from those notes are reflected in this note.      # Hypercalcemia: Highest Ca = 10.2 mg/dL in last 30 days, will monitor as appropriate         # Overweight: Estimated body mass index is 25.06 kg/m  as calculated from the following:    Height as of this encounter: 1.651 m (5' 5\").    Weight as of this encounter: 68.3 kg (150 lb 9.6 oz).      "

## 2024-05-23 ENCOUNTER — TRANSCRIBE ORDERS (OUTPATIENT)
Dept: OTHER | Age: 54
End: 2024-05-23

## 2024-05-23 ENCOUNTER — MEDICAL CORRESPONDENCE (OUTPATIENT)
Dept: HEALTH INFORMATION MANAGEMENT | Facility: CLINIC | Age: 54
End: 2024-05-23
Payer: COMMERCIAL

## 2024-05-23 DIAGNOSIS — K80.20 GALLSTONE: Primary | ICD-10-CM

## 2024-06-02 ENCOUNTER — HOSPITAL ENCOUNTER (INPATIENT)
Facility: CLINIC | Age: 54
LOS: 1 days | Discharge: HOME | End: 2024-06-03
Attending: EMERGENCY MEDICINE
Payer: COMMERCIAL

## 2024-06-02 ENCOUNTER — ANESTHESIA EVENT (OUTPATIENT)
Dept: SURGERY | Facility: HOSPITAL | Age: 54
End: 2024-06-02
Payer: COMMERCIAL

## 2024-06-02 ENCOUNTER — APPOINTMENT (OUTPATIENT)
Dept: MRI IMAGING | Facility: CLINIC | Age: 54
End: 2024-06-02
Attending: EMERGENCY MEDICINE
Payer: COMMERCIAL

## 2024-06-02 ENCOUNTER — APPOINTMENT (OUTPATIENT)
Dept: ULTRASOUND IMAGING | Facility: CLINIC | Age: 54
End: 2024-06-02
Attending: EMERGENCY MEDICINE
Payer: COMMERCIAL

## 2024-06-02 DIAGNOSIS — R10.13 ABDOMINAL PAIN, EPIGASTRIC: ICD-10-CM

## 2024-06-02 DIAGNOSIS — K80.50 CHOLEDOCHOLITHIASIS: ICD-10-CM

## 2024-06-02 LAB
ALBUMIN SERPL BCG-MCNC: 4.8 G/DL (ref 3.5–5.2)
ALP SERPL-CCNC: 63 U/L (ref 40–150)
ALT SERPL W P-5'-P-CCNC: 63 U/L (ref 0–70)
ANION GAP SERPL CALCULATED.3IONS-SCNC: 12 MMOL/L (ref 7–15)
AST SERPL W P-5'-P-CCNC: 74 U/L (ref 0–45)
BASOPHILS # BLD AUTO: 0 10E3/UL (ref 0–0.2)
BASOPHILS NFR BLD AUTO: 0 %
BILIRUB SERPL-MCNC: 0.6 MG/DL
BUN SERPL-MCNC: 16.3 MG/DL (ref 6–20)
CALCIUM SERPL-MCNC: 9.7 MG/DL (ref 8.6–10)
CHLORIDE SERPL-SCNC: 100 MMOL/L (ref 98–107)
CREAT SERPL-MCNC: 0.95 MG/DL (ref 0.67–1.17)
DEPRECATED HCO3 PLAS-SCNC: 26 MMOL/L (ref 22–29)
EGFRCR SERPLBLD CKD-EPI 2021: >90 ML/MIN/1.73M2
EOSINOPHIL # BLD AUTO: 0.1 10E3/UL (ref 0–0.7)
EOSINOPHIL NFR BLD AUTO: 1 %
ERYTHROCYTE [DISTWIDTH] IN BLOOD BY AUTOMATED COUNT: 14.1 % (ref 10–15)
GLUCOSE SERPL-MCNC: 170 MG/DL (ref 70–99)
HCT VFR BLD AUTO: 41.1 % (ref 40–53)
HGB BLD-MCNC: 13.9 G/DL (ref 13.3–17.7)
IMM GRANULOCYTES # BLD: 0.1 10E3/UL
IMM GRANULOCYTES NFR BLD: 1 %
LIPASE SERPL-CCNC: 60 U/L (ref 13–60)
LYMPHOCYTES # BLD AUTO: 2.9 10E3/UL (ref 0.8–5.3)
LYMPHOCYTES NFR BLD AUTO: 23 %
MCH RBC QN AUTO: 29.4 PG (ref 26.5–33)
MCHC RBC AUTO-ENTMCNC: 33.8 G/DL (ref 31.5–36.5)
MCV RBC AUTO: 87 FL (ref 78–100)
MONOCYTES # BLD AUTO: 0.5 10E3/UL (ref 0–1.3)
MONOCYTES NFR BLD AUTO: 4 %
NEUTROPHILS # BLD AUTO: 9.2 10E3/UL (ref 1.6–8.3)
NEUTROPHILS NFR BLD AUTO: 72 %
NRBC # BLD AUTO: 0 10E3/UL
NRBC BLD AUTO-RTO: 0 /100
PLATELET # BLD AUTO: 257 10E3/UL (ref 150–450)
POTASSIUM SERPL-SCNC: 3.8 MMOL/L (ref 3.4–5.3)
PROT SERPL-MCNC: 7.4 G/DL (ref 6.4–8.3)
RBC # BLD AUTO: 4.73 10E6/UL (ref 4.4–5.9)
SODIUM SERPL-SCNC: 138 MMOL/L (ref 135–145)
WBC # BLD AUTO: 12.7 10E3/UL (ref 4–11)

## 2024-06-02 PROCEDURE — 83690 ASSAY OF LIPASE: CPT | Performed by: EMERGENCY MEDICINE

## 2024-06-02 PROCEDURE — 258N000003 HC RX IP 258 OP 636: Performed by: EMERGENCY MEDICINE

## 2024-06-02 PROCEDURE — 250N000011 HC RX IP 250 OP 636: Performed by: HOSPITALIST

## 2024-06-02 PROCEDURE — 96361 HYDRATE IV INFUSION ADD-ON: CPT

## 2024-06-02 PROCEDURE — 96374 THER/PROPH/DIAG INJ IV PUSH: CPT | Mod: 59

## 2024-06-02 PROCEDURE — 120N000001 HC R&B MED SURG/OB

## 2024-06-02 PROCEDURE — 96375 TX/PRO/DX INJ NEW DRUG ADDON: CPT

## 2024-06-02 PROCEDURE — 76705 ECHO EXAM OF ABDOMEN: CPT

## 2024-06-02 PROCEDURE — 99285 EMERGENCY DEPT VISIT HI MDM: CPT | Mod: 25

## 2024-06-02 PROCEDURE — 250N000011 HC RX IP 250 OP 636: Performed by: EMERGENCY MEDICINE

## 2024-06-02 PROCEDURE — 85025 COMPLETE CBC W/AUTO DIFF WBC: CPT | Performed by: EMERGENCY MEDICINE

## 2024-06-02 PROCEDURE — 74183 MRI ABD W/O CNTR FLWD CNTR: CPT

## 2024-06-02 PROCEDURE — A9585 GADOBUTROL INJECTION: HCPCS | Performed by: EMERGENCY MEDICINE

## 2024-06-02 PROCEDURE — 255N000002 HC RX 255 OP 636: Performed by: EMERGENCY MEDICINE

## 2024-06-02 PROCEDURE — 36415 COLL VENOUS BLD VENIPUNCTURE: CPT | Performed by: EMERGENCY MEDICINE

## 2024-06-02 PROCEDURE — 99223 1ST HOSP IP/OBS HIGH 75: CPT | Performed by: HOSPITALIST

## 2024-06-02 PROCEDURE — 80053 COMPREHEN METABOLIC PANEL: CPT | Performed by: EMERGENCY MEDICINE

## 2024-06-02 RX ORDER — ACETAMINOPHEN 650 MG/1
650 SUPPOSITORY RECTAL EVERY 4 HOURS PRN
Status: DISCONTINUED | OUTPATIENT
Start: 2024-06-02 | End: 2024-06-03 | Stop reason: HOSPADM

## 2024-06-02 RX ORDER — INDOMETHACIN 50 MG/1
100 SUPPOSITORY RECTAL
Status: CANCELLED | OUTPATIENT
Start: 2024-06-02

## 2024-06-02 RX ORDER — GADOBUTROL 604.72 MG/ML
7 INJECTION INTRAVENOUS ONCE
Status: COMPLETED | OUTPATIENT
Start: 2024-06-02 | End: 2024-06-02

## 2024-06-02 RX ORDER — PIPERACILLIN SODIUM, TAZOBACTAM SODIUM 3; .375 G/15ML; G/15ML
3.38 INJECTION, POWDER, LYOPHILIZED, FOR SOLUTION INTRAVENOUS ONCE
Status: COMPLETED | OUTPATIENT
Start: 2024-06-02 | End: 2024-06-02

## 2024-06-02 RX ORDER — ACETAMINOPHEN 325 MG/1
650 TABLET ORAL EVERY 4 HOURS PRN
Status: DISCONTINUED | OUTPATIENT
Start: 2024-06-02 | End: 2024-06-03 | Stop reason: HOSPADM

## 2024-06-02 RX ORDER — SODIUM CHLORIDE 9 MG/ML
INJECTION, SOLUTION INTRAVENOUS CONTINUOUS
Status: DISCONTINUED | OUTPATIENT
Start: 2024-06-03 | End: 2024-06-03 | Stop reason: HOSPADM

## 2024-06-02 RX ORDER — PROCHLORPERAZINE MALEATE 10 MG
10 TABLET ORAL EVERY 6 HOURS PRN
Status: DISCONTINUED | OUTPATIENT
Start: 2024-06-02 | End: 2024-06-03 | Stop reason: HOSPADM

## 2024-06-02 RX ORDER — NALOXONE HYDROCHLORIDE 0.4 MG/ML
0.2 INJECTION, SOLUTION INTRAMUSCULAR; INTRAVENOUS; SUBCUTANEOUS
Status: DISCONTINUED | OUTPATIENT
Start: 2024-06-02 | End: 2024-06-03 | Stop reason: HOSPADM

## 2024-06-02 RX ORDER — PIPERACILLIN SODIUM, TAZOBACTAM SODIUM 3; .375 G/15ML; G/15ML
3.38 INJECTION, POWDER, LYOPHILIZED, FOR SOLUTION INTRAVENOUS EVERY 8 HOURS
Status: DISCONTINUED | OUTPATIENT
Start: 2024-06-02 | End: 2024-06-02

## 2024-06-02 RX ORDER — AMOXICILLIN 250 MG
2 CAPSULE ORAL 2 TIMES DAILY PRN
Status: DISCONTINUED | OUTPATIENT
Start: 2024-06-02 | End: 2024-06-03 | Stop reason: HOSPADM

## 2024-06-02 RX ORDER — NALOXONE HYDROCHLORIDE 0.4 MG/ML
0.4 INJECTION, SOLUTION INTRAMUSCULAR; INTRAVENOUS; SUBCUTANEOUS
Status: DISCONTINUED | OUTPATIENT
Start: 2024-06-02 | End: 2024-06-03 | Stop reason: HOSPADM

## 2024-06-02 RX ORDER — PROCHLORPERAZINE 25 MG
25 SUPPOSITORY, RECTAL RECTAL EVERY 12 HOURS PRN
Status: DISCONTINUED | OUTPATIENT
Start: 2024-06-02 | End: 2024-06-03 | Stop reason: HOSPADM

## 2024-06-02 RX ORDER — AMOXICILLIN 500 MG/1
500 TABLET, FILM COATED ORAL 2 TIMES DAILY PRN
COMMUNITY

## 2024-06-02 RX ORDER — POLYETHYLENE GLYCOL 3350 17 G/17G
17 POWDER, FOR SOLUTION ORAL 2 TIMES DAILY PRN
Status: DISCONTINUED | OUTPATIENT
Start: 2024-06-02 | End: 2024-06-03 | Stop reason: HOSPADM

## 2024-06-02 RX ORDER — ONDANSETRON 2 MG/ML
4 INJECTION INTRAMUSCULAR; INTRAVENOUS EVERY 6 HOURS PRN
Status: DISCONTINUED | OUTPATIENT
Start: 2024-06-02 | End: 2024-06-03 | Stop reason: HOSPADM

## 2024-06-02 RX ORDER — ONDANSETRON 4 MG/1
4 TABLET, ORALLY DISINTEGRATING ORAL EVERY 6 HOURS PRN
Status: DISCONTINUED | OUTPATIENT
Start: 2024-06-02 | End: 2024-06-03 | Stop reason: HOSPADM

## 2024-06-02 RX ORDER — CALCIUM CARBONATE 500 MG/1
1000 TABLET, CHEWABLE ORAL 4 TIMES DAILY PRN
Status: DISCONTINUED | OUTPATIENT
Start: 2024-06-02 | End: 2024-06-03 | Stop reason: HOSPADM

## 2024-06-02 RX ORDER — LIDOCAINE 40 MG/G
CREAM TOPICAL
Status: DISCONTINUED | OUTPATIENT
Start: 2024-06-02 | End: 2024-06-03 | Stop reason: HOSPADM

## 2024-06-02 RX ORDER — LIDOCAINE 40 MG/G
CREAM TOPICAL
Status: CANCELLED | OUTPATIENT
Start: 2024-06-02

## 2024-06-02 RX ORDER — ONDANSETRON 2 MG/ML
4 INJECTION INTRAMUSCULAR; INTRAVENOUS ONCE
Status: COMPLETED | OUTPATIENT
Start: 2024-06-02 | End: 2024-06-02

## 2024-06-02 RX ORDER — AMOXICILLIN 250 MG
1 CAPSULE ORAL 2 TIMES DAILY PRN
Status: DISCONTINUED | OUTPATIENT
Start: 2024-06-02 | End: 2024-06-03 | Stop reason: HOSPADM

## 2024-06-02 RX ORDER — PIPERACILLIN SODIUM, TAZOBACTAM SODIUM 3; .375 G/15ML; G/15ML
3.38 INJECTION, POWDER, LYOPHILIZED, FOR SOLUTION INTRAVENOUS EVERY 8 HOURS
Status: DISCONTINUED | OUTPATIENT
Start: 2024-06-02 | End: 2024-06-03 | Stop reason: HOSPADM

## 2024-06-02 RX ADMIN — PIPERACILLIN AND TAZOBACTAM 3.38 G: 3; .375 INJECTION, POWDER, FOR SOLUTION INTRAVENOUS at 19:42

## 2024-06-02 RX ADMIN — ONDANSETRON 4 MG: 2 INJECTION INTRAMUSCULAR; INTRAVENOUS at 05:24

## 2024-06-02 RX ADMIN — PIPERACILLIN AND TAZOBACTAM 3.38 G: 3; .375 INJECTION, POWDER, FOR SOLUTION INTRAVENOUS at 14:21

## 2024-06-02 RX ADMIN — SODIUM CHLORIDE 1000 ML: 9 INJECTION, SOLUTION INTRAVENOUS at 05:21

## 2024-06-02 RX ADMIN — GADOBUTROL 7 ML: 604.72 INJECTION INTRAVENOUS at 08:44

## 2024-06-02 RX ADMIN — HYDROMORPHONE HYDROCHLORIDE 1 MG: 1 INJECTION, SOLUTION INTRAMUSCULAR; INTRAVENOUS; SUBCUTANEOUS at 05:24

## 2024-06-02 ASSESSMENT — ACTIVITIES OF DAILY LIVING (ADL)
ADLS_ACUITY_SCORE: 20
ADLS_ACUITY_SCORE: 35
ADLS_ACUITY_SCORE: 35
ADLS_ACUITY_SCORE: 33
ADLS_ACUITY_SCORE: 35
ADLS_ACUITY_SCORE: 20
ADLS_ACUITY_SCORE: 35
ADLS_ACUITY_SCORE: 20
ADLS_ACUITY_SCORE: 20
ADLS_ACUITY_SCORE: 35
ADLS_ACUITY_SCORE: 20
ADLS_ACUITY_SCORE: 35
ADLS_ACUITY_SCORE: 20

## 2024-06-02 ASSESSMENT — ENCOUNTER SYMPTOMS
VOMITING: 0
DIARRHEA: 0
NAUSEA: 1
SHORTNESS OF BREATH: 0
ABDOMINAL PAIN: 1

## 2024-06-02 ASSESSMENT — COLUMBIA-SUICIDE SEVERITY RATING SCALE - C-SSRS
2. HAVE YOU ACTUALLY HAD ANY THOUGHTS OF KILLING YOURSELF IN THE PAST MONTH?: NO
6. HAVE YOU EVER DONE ANYTHING, STARTED TO DO ANYTHING, OR PREPARED TO DO ANYTHING TO END YOUR LIFE?: NO
1. IN THE PAST MONTH, HAVE YOU WISHED YOU WERE DEAD OR WISHED YOU COULD GO TO SLEEP AND NOT WAKE UP?: NO

## 2024-06-02 NOTE — PLAN OF CARE
Goal Outcome Evaluation:       Pt came to the unit around 1330. His vitals are stable. He denies any pain. He is on a clear fluid diet. He will be NPO at midnight. His IV is infusing zosyn.         Problem: Adult Inpatient Plan of Care  Goal: Optimal Comfort and Wellbeing  6/2/2024 1501 by Maggie Urbina RN  Outcome: Progressing  6/2/2024 1406 by Maggie Urbina RN  Outcome: Progressing     Problem: Pain Acute  Goal: Optimal Pain Control and Function  6/2/2024 1501 by Maggie Urbina RN  Outcome: Progressing  6/2/2024 1406 by Maggie Urbina RN  Outcome: Progressing     Problem: Adult Inpatient Plan of Care  Goal: Absence of Hospital-Acquired Illness or Injury  6/2/2024 1501 by Maggie Urbina RN  Outcome: Progressing  6/2/2024 1406 by Maggie Urbina RN  Outcome: Progressing  Intervention: Prevent Skin Injury  Recent Flowsheet Documentation  Taken 6/2/2024 1335 by Maggie Urbina RN  Body Position: position changed independently  Intervention: Prevent Infection  Recent Flowsheet Documentation  Taken 6/2/2024 1335 by Maggie Urbina RN  Infection Prevention:   single patient room provided   hand hygiene promoted

## 2024-06-02 NOTE — ED TRIAGE NOTES
Pt has epi gastric pain that started at 0100 this am. States hx gall stone. States feels like something stuck. Denies shoulder pain. Some back pain. Pt states is more frequently belching.      Triage Assessment (Adult)       Row Name 06/02/24 0453          Triage Assessment    Airway WDL WDL

## 2024-06-02 NOTE — ED NOTES
EMERGENCY DEPARTMENT SIGN OUT NOTE        ED COURSE AND MEDICAL DECISION MAKING  Patient was signed out to me by overnight provider    Evaluated for epigastric pain    Patient found to have elevated liver function tests along with dilated common bile duct.    MRCP ordered and results signed out at end of shift    MRCP was performed which showed choledocholithiasis    Gastroenterology was consulted.  Transfer to [facility] for ERCP recommended.    SOC was consulted.  I spoke with Dr. SHULTZ who accepted the patient to [facility]        FINAL IMPRESSION    1. Abdominal pain, epigastric    2. Choledocholithiasis        ED MEDS  Medications   ondansetron (ZOFRAN) injection 4 mg (4 mg Intravenous $Given 6/2/24 0524)   HYDROmorphone (DILAUDID) injection 1 mg (1 mg Intravenous $Given 6/2/24 0524)   sodium chloride 0.9% BOLUS 1,000 mL (0 mLs Intravenous Infusion stopped per MD order 6/2/24 1036)   gadobutrol (GADAVIST) injection 7 mL (7 mLs Intravenous $Given 6/2/24 3948)       LAB  Labs Ordered and Resulted from Time of ED Arrival to Time of ED Departure   COMPREHENSIVE METABOLIC PANEL - Abnormal       Result Value    Sodium 138      Potassium 3.8      Carbon Dioxide (CO2) 26      Anion Gap 12      Urea Nitrogen 16.3      Creatinine 0.95      GFR Estimate >90      Calcium 9.7      Chloride 100      Glucose 170 (*)     Alkaline Phosphatase 63      AST 74 (*)     ALT 63      Protein Total 7.4      Albumin 4.8      Bilirubin Total 0.6     CBC WITH PLATELETS AND DIFFERENTIAL - Abnormal    WBC Count 12.7 (*)     RBC Count 4.73      Hemoglobin 13.9      Hematocrit 41.1      MCV 87      MCH 29.4      MCHC 33.8      RDW 14.1      Platelet Count 257      % Neutrophils 72      % Lymphocytes 23      % Monocytes 4      % Eosinophils 1      % Basophils 0      % Immature Granulocytes 1      NRBCs per 100 WBC 0      Absolute Neutrophils 9.2 (*)     Absolute Lymphocytes 2.9      Absolute Monocytes 0.5      Absolute Eosinophils 0.1       Absolute Basophils 0.0      Absolute Immature Granulocytes 0.1      Absolute NRBCs 0.0     LIPASE - Normal    Lipase 60           RADIOLOGY    MR Abdomen MRCP w/o & w Contrast   Final Result   IMPRESSION:      1.  There is a 0.2 cm choledocholith within the distal common bile duct near the ampulla resulting in mild extrahepatic biliary ductal dilation. No intrahepatic biliary ductal dilation.       2.  Cholelithiasis with mild gallbladder distention and diffuse gallbladder wall edema, most likely related to the choledocholith. Other causes of gallbladder wall edema include IV hydration, reactive changes to hepatitis, and cholecystitis.      3.  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 No high-risk features. See follow-up guidelines below.      4.  Mild diffuse hepatic steatosis.      REFERENCE:   Revisions of international consensus Fukuoka guidelines for the management of IPMN of the pancreas. Pancreatology 2017;17(5):738-753.      Largest cyst less than 10 mm: CT or MRI/MRCP in 6 months and then every 2 years if no change.      US Abdomen Limited   Final Result   IMPRESSION:   1.  Cholelithiasis without evidence for cholecystitis.      2.  However, there is dilatation of the common bile duct up to 1.1 cm. Cannot exclude distal common bile duct stone or obstructing process. Correlation with biliary enzymes and consider MRCP for further evaluation.      3.  Incidentally noted is adenomyomatosis of the gallbladder.             DISCHARGE MEDS  New Prescriptions    No medications on file       Anthony David MD  [facility] EMERGENCY ROOM  [facility] [facility] [place] [address] [phone]         Anthony David MD  06/02/24 1045       Anthony David MD  06/02/24 1202

## 2024-06-02 NOTE — H&P
[facility]    History and Physical - Hospitalist Service       Date of Admission:  6/2/2024    Assessment & Plan      Trini Salter is a 53 year old male admitted on 6/2/2024. He presents with acute abdominal pain and found to have choledocholithiasis. Per SOC, no beds at [place], admit to [facility], keep at [facility] and on 6/3/24 patient will go to [place] for ERCP and then back to [facility].     Choledocholithiasis  -GI appreciated  -Will go to [facility] tomorrow and then back to [facility], per plan arranged by ED MD, SOC, and on-call GI.   -Pain control  -Antibiotic ppx          Diet: Clear Liquid Diet  NPO per Anesthesia Guidelines for Procedure/Surgery Except for: Meds    DVT Prophylaxis: Pneumatic Compression Devices, Anti-embolisim stockings (TEDs), and VTE Prophylaxis contraindicated due to invasive procedure tomorrow AM  Vang Catheter: Not present  Lines: None     Cardiac Monitoring: None  Code Status: Full Code      Clinically Significant Risk Factors Present on Admission                                  Disposition Plan     Medically Ready for Discharge: Anticipated in 2-4 Days           Nathaniel Montes De Oca MD  Hospitalist Service  [facility]  Securely message with [employer] (more info)  Text page via [employer] Paging/Directory     ______________________________________________________________________    Chief Complaint   Abd pain    History is obtained from the patient    History of Present Illness   Trini Salter is a 53 year old male admitted on 6/2/2024. He presents with acute abdominal pain and found to have choledocholithiasis. Per SOC, no beds at [place], admit to [facility], keep at [facility] and on 6/3/24 patient will go to [place] for ERCP and then back to [facility].     Has had prior episodes in Jan and Feb that resolved on its own. This time, the abdominal pain progressed and became unbearable with severe pain, and that prompted his ED visit.  Found to have choledocholithiasis. No fevers. NO chest pain, no SOB. No sick contacts. No recent diet changes. Otherwise, there is no endorsement of any fevers, rigors, chest pain or shortness of breath, urinary symptoms, focal weakness, or any other new and associated symptoms at this time. 14 point review of systems performed without any other pertinent positives at this time.     All questions the patient had at this time were answered to verbalized and stated satisfaction and understanding. Code status was discussed and the patient confirmed wishes for full codd for this hospitalization.   =      Past Medical History    Past Medical History:   Diagnosis Date    External hemorrhoids     Folliculitis     Hyperlipidemia        Past Surgical History   Past Surgical History:   Procedure Laterality Date    ESOPHAGOSCOPY, GASTROSCOPY, DUODENOSCOPY (EGD), COMBINED N/A 4/26/2024    Procedure: ENDOSCOPIC ULTRASOUND;  Surgeon: Dmitriy Gan MD;  Location: [facility] OR       Prior to Admission Medications   Prior to Admission Medications   Prescriptions Last Dose Informant Patient Reported? Taking?   MULTIVITAMIN ORAL 6/1/2024 at AM  Yes Yes   Sig: [MULTIVITAMIN ORAL] Take by mouth daily.   amoxicillin (AMOXIL) 500 MG tablet 6/1/2024 at AM  Yes Yes   Sig: Take 500 mg by mouth 2 times daily as needed (flares (foliculitis))   cholecalciferol (VITAMIN D3) 25 mcg (1000 units) capsule 6/1/2024 at AM  Yes Yes   Sig: Take 1 capsule by mouth daily      Facility-Administered Medications: None           Physical Exam   Vital Signs: Temp: 97.2  F (36.2  C)   BP: (!) 142/81 Pulse: 67   Resp: 20 SpO2: 100 % O2 Device: None (Room air)    Weight: 0 lbs 0 oz    GENERAL:  Alert, appears comfortable, in no acute distress, appears stated age   HEAD:  Normocephalic, without obvious abnormality, atraumatic   EYES:  PERRL, conjunctiva/corneas clear, no scleral icterus, EOM's intact   NOSE: Nares normal, septum midline, mucosa  normal, no drainage   THROAT: Lips, mucosa, and tongue normal; teeth and gums normal, mouth moist   NECK: Supple, symmetrical, trachea midline   BACK:   Symmetric, no curvature, ROM normal   LUNGS:   Symmetric chest rise on inhalation, respirations unlabored   CHEST WALL:  No tenderness or deformity   HEART:  Regular rate and rhythm, S1 and S2 normal, no murmur, rub, or gallop    ABDOMEN:   Soft, minimal tenderness (just got pain meds) b no organomegaly, no rebound or guarding   EXTREMITIES: Extremities normal, atraumatic, no cyanosis or edema    SKIN: Dry to touch, no exanthems in the visualized areas   NEURO: Alert, oriented x 4, moves all four extremities freely/spontaneously   PSYCH: Cooperative, behavior is appropriate      Medical Decision Making       76 MINUTES SPENT BY ME on the date of service doing chart review, history, exam, documentation & further activities per the note.      Data     I have personally reviewed the following data over the past 24 hrs:    12.7 (H)  \   13.9   / 257     138 100 16.3 /  170 (H)   3.8 26 0.95 \     ALT: 63 AST: 74 (H) AP: 63 TBILI: 0.6   ALB: 4.8 TOT PROTEIN: 7.4 LIPASE: 60       Imaging results reviewed over the past 24 hrs:   Recent Results (from the past 24 hour(s))   US Abdomen Limited    Narrative    EXAM: ULTRASOUND ABDOMEN LIMITED  LOCATION: [facility]  DATE: 06/02/2024    INDICATION: Right upper quadrant pain.  COMPARISON: 03/14/2024.  TECHNIQUE: Limited abdominal ultrasound.    FINDINGS:    GALLBLADDER: Cholelithiasis. No gallbladder wall thickening. Negative sonographic Hudson's sign. No evidence for cholecystitis. Ringdown artifact seen in the gallbladder wall compatible with adenomyomatosis.    BILE DUCTS: No intrahepatic biliary dilatation. Common bile duct, however, is dilated up to 1.1 cm.    LIVER: Normal parenchyma with smooth contour. No focal mass.    RIGHT KIDNEY: No hydronephrosis.    PANCREAS: Limited visualization due to  overlying bowel gas.    No ascites.      Impression    IMPRESSION:  1.  Cholelithiasis without evidence for cholecystitis.    2.  However, there is dilatation of the common bile duct up to 1.1 cm. Cannot exclude distal common bile duct stone or obstructing process. Correlation with biliary enzymes and consider MRCP for further evaluation.    3.  Incidentally noted is adenomyomatosis of the gallbladder.     MR Abdomen MRCP w/o & w Contrast    Narrative    EXAM: MR ABDOMEN MRCP W/O and W CONTRAST  LOCATION: [facility]  DATE: 6/2/2024    INDICATION: Right upper quadrant pain. Dilated common bile duct on ultrasound.  COMPARISON: Ultrasound 6/2/2024.  TECHNIQUE: Routine MR liver/pancreas protocol including axial and coronal MRCP sequences. 2D and 3D reconstruction performed by MR technologist including MIP reconstruction and slab cholangiograms. If performed with contrast, additional dynamic T1 post   IV contrast images.  CONTRAST: Gadavist 7 mL     FINDINGS:     LIVER: Noncirrhotic liver morphology. Mild diffuse hepatic steatosis. No focal liver lesions.    GALLBLADDER/BILIARY: Gallbladder is diffusely edematous, mildly distended, and contains a single gallstone. Mild dilation of the common bile duct up to 0.9 cm with gradual tapering towards the ampulla. There is a 0.2 cm choledocholith within the distal   common bile duct near the ampulla (13/#24). No intrahepatic biliary ductal dilation.    PANCREAS: Multiple tiny cystic lesions within the tail of the pancreas measuring up to 0.7 cm (13/#22). No solid pancreatic mass. No dilation of the main pancreatic duct. Conventional ductal anatomy.    SPLEEN: Normal.    ADRENALS: Normal.    KIDNEYS: Normal.    BOWEL: No bowel obstruction or inflammation. Normal appendix.    LYMPH NODES: No enlarged lymph node. Patent portal, splenic, and superior mesenteric veins. No abdominal aortic aneurysm.    VASCULATURE: Normal.    LUNG BASES:  Normal.    MUSCULOSKELETAL: No acute bony abnormality.      Impression    IMPRESSION:    1.  There is a 0.2 cm choledocholith within the distal common bile duct near the ampulla resulting in mild extrahepatic biliary ductal dilation. No intrahepatic biliary ductal dilation.     2.  Cholelithiasis with mild gallbladder distention and diffuse gallbladder wall edema, most likely related to the choledocholith. Other causes of gallbladder wall edema include IV hydration, reactive changes to hepatitis, and cholecystitis.    3.  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 No high-risk features. See follow-up guidelines below.    4.  Mild diffuse hepatic steatosis.    REFERENCE:  Revisions of international consensus Fukuoka guidelines for the management of IPMN of the pancreas. Pancreatology 2017;17(5):738-753.    Largest cyst less than 10 mm: CT or MRI/MRCP in 6 months and then every 2 years if no change.

## 2024-06-02 NOTE — ED PROVIDER NOTES
EMERGENCY DEPARTMENT ENCOUNTER      NAME: Trini Salter  AGE: 53 year old male  YOB: 1970  MRN: [id number]  EVALUATION DATE & TIME: No admission date for patient encounter.    PCP: Loy Powell    ED PROVIDER: Ana Maria Grijalva M.D.      Chief Complaint   Patient presents with    Abdominal Pain         FINAL IMPRESSION:  1. Abdominal pain, epigastric        MEDICAL DECISION MAKING:    Pertinent Labs & Imaging studies reviewed. (See chart for details)  ED Course as of 06/02/24 0634   Sun Jun 02, 2024   0458 Reviewed ERCP for patient 4/26/2024.  Noted gallbladder stones, fatty liver, diminutive pancreatic cyst.     Afebrile.  Vital signs here with hypertension.  Patient coming in with epigastric and right upper quadrant abdominal pain.  Does look uncomfortable here.  History of recent ERCP.  They noted cholelithiasis without evidence for choledocholithiasis or cholecystitis.  States it feels similar to when he had an attack at the beginning of the month which prompted his visit to gastroenterology.  Noted multiple stones on his ERCP.  Exam here concerning for possible gallbladder etiology versus liver etiology versus pancreatitis.  No chest wall discomfort.  No chest pain.  No shortness of breath or fever.  No trauma.  No lower abdominal symptoms.  Plan for labs, pain medicine, fluids, ultrasound.    Labs back here with very mild elevation of AST.  Otherwise labs unremarkable.  Ultrasound shows no evidence for cholecystitis however dilated common bile duct 1.1 cm.  At his ERCP was 0.8.  Cannot rule out choledocholithiasis.  Will order for MRCP.  Patient updated as to all listed of his results.  His pain is very well-controlled at this time.  Will be signed out to dayshift pending follow-up of MRCP.  Of note patient does have follow-up with surgery scheduled for Thursday of this week.    Medical Decision Making  Obtained supplemental history:Supplemental history obtained?: No  Reviewed external  records: External records reviewed?: Documented in chart  Care impacted by chronic illness:Hyperlipidemia  Care significantly affected by social determinants of health:N/A  Did you consider but not order tests?: Work up considered but not performed and documented in chart, if applicable  Did you interpret images independently?: Independent interpretation of ECG and images noted in documentation, when applicable.  Consultation discussion with other provider:Did you involve another provider (consultant, , pharmacy, etc.)?: No  Admission considered. Patient was signed out to the oncoming physician, disposition pending.      Critical care: 0 minutes excluding separately billable procedures.  Includes bedside management, time reviewing test results, review of records, discussing the case with staff, documenting the medical record and time spent with family members (or surrogate decision makers) discussing specific treatment issues.          ED COURSE:  The importance of close follow up was discussed. We reviewed warning signs and symptoms, and I instructed Mr. Salter to return to the emergency department immediately if he develops any new or worsening symptoms. I provided additional verbal discharge instructions. Mr. Salter expressed understanding and agreement with this plan of care, his questions were answered, and he was discharged in stable condition.     MEDICATIONS GIVEN IN THE EMERGENCY:  Medications   ondansetron (ZOFRAN) injection 4 mg (4 mg Intravenous $Given 6/2/24 0524)   HYDROmorphone (DILAUDID) injection 1 mg (1 mg Intravenous $Given 6/2/24 0524)   sodium chloride 0.9% BOLUS 1,000 mL (1,000 mLs Intravenous $New Bag 6/2/24 0521)       NEW PRESCRIPTIONS STARTED AT TODAY'S ER VISIT:  New Prescriptions    No medications on file          =================================================================    HPI    Patient information was obtained from: Patient    Use of : N/A         Trini Salter is a  Patient is coming in with epigastric and right upper quadrant pain.  Started yesterday.  Worse today.  Now feeling nauseated.  History of symptomatic cholelithiasis.  Recent ERCP.  Pain does serve ordinate in his epigastric region, radiates around to his back.  No cough.  No shortness of breath.  No fevers chills.  No recent travel.53 year old male who presents with epigastric and right upper quadrant abdominal pain.      REVIEW OF SYSTEMS   Review of Systems   Respiratory:  Negative for shortness of breath.    Cardiovascular:  Negative for chest pain.   Gastrointestinal:  Positive for abdominal pain and nausea. Negative for diarrhea and vomiting.   All other systems reviewed and are negative.        PAST MEDICAL HISTORY:  Past Medical History:   Diagnosis Date    External hemorrhoids     Folliculitis     Hyperlipidemia        PAST SURGICAL HISTORY:  Past Surgical History:   Procedure Laterality Date    ESOPHAGOSCOPY, GASTROSCOPY, DUODENOSCOPY (EGD), COMBINED N/A 4/26/2024    Procedure: ENDOSCOPIC ULTRASOUND;  Surgeon: Dmitriy Gan MD;  Location: [facility] OR       CURRENT MEDICATIONS:    No current facility-administered medications for this encounter.    Current Outpatient Medications:     amoxicillin (AMOXIL) 500 MG tablet, [AMOXICILLIN (AMOXIL) 500 MG TABLET] Take 1 tablet (500 mg total) by mouth daily., Disp: 90 tablet, Rfl: 0    cholecalciferol (VITAMIN D3) 25 mcg (1000 units) capsule, Take 1 capsule by mouth daily, Disp: , Rfl:     MULTIVITAMIN ORAL, [MULTIVITAMIN ORAL] Take by mouth daily., Disp: , Rfl:     ALLERGIES:  No Known Allergies    FAMILY HISTORY:  Family History   Problem Relation Age of Onset    Cancer Mother         Ovarian    Parkinsonism Father     Hypotension Father     Benign prostatic hyperplasia Father     Hyperlipidemia Brother     Dementia Paternal Grandfather     Parkinsonism Paternal Grandfather        SOCIAL HISTORY:   Social History     Socioeconomic History     Marital status:     Number of children: 2   Occupational History    Occupation:      Comment: work from home   Tobacco Use    Smoking status: Former     Current packs/day: 0.00     Types: Cigarettes     Quit date: 9/10/2003     Years since quittin.7    Smokeless tobacco: Never    Tobacco comments:     Light   Substance and Sexual Activity    Alcohol use: No     Comment: Alcoholic Drinks/day:      Drug use: No    Sexual activity: Yes     Partners: Female   Social History Narrative    Diet- Regular, mostly [place] food.            Exercise- Treadmill, exercise videos, walking 3-4x per week, playing with kids            Originally from [facility] [place]; [place].     Social Determinants of Health     Interpersonal Safety: Low Risk  (4/3/2024)    Interpersonal Safety     Do you feel physically and emotionally safe where you currently live?: Yes     Within the past 12 months, have you been hit, slapped, kicked or otherwise physically hurt by someone?: No     Within the past 12 months, have you been humiliated or emotionally abused in other ways by your partner or ex-partner?: No       PHYSICAL EXAM:    Vitals: BP (!) 160/90   Pulse 69   Temp 97.2  F (36.2  C)   Resp 20   SpO2 100%    General:. Alert and interactive, mildly uncomfortable appearing  HENT: Oropharynx without erythema or exudates. MMM.  TMs clear bilaterally.  Eyes: Pupils mid-sized and equally reactive.   Neck: Full AROM.  No midline tenderness to palpation.  Cardiovascular: Regular rate and rhythm. Peripheral pulses 2+ bilaterally.  Chest/Pulmonary: Normal work of breathing. Lung sounds clear and equal throughout, no wheezes or crackles. No chest wall tenderness or deformities.  Abdomen: Epigastric and right upper quadrant tenderness to palpation.  Positive Hudson sign.  No lower abdominal tenderness.  No rebound or guarding.  Back/Spine: No CVA or midline tenderness.  Extremities: Normal ROM of all major joints. No lower extremity  edema.   Skin: Warm and dry. Normal skin color.   Neuro: Speech clear. CNs grossly intact. Moves all extremities appropriately. Strength and sensation grossly intact to all extremities.   Psych: Normal affect/mood, cooperative, memory appropriate.     LAB:  All pertinent labs reviewed and interpreted.  Labs Ordered and Resulted from Time of ED Arrival to Time of ED Departure   COMPREHENSIVE METABOLIC PANEL - Abnormal       Result Value    Sodium 138      Potassium 3.8      Carbon Dioxide (CO2) 26      Anion Gap 12      Urea Nitrogen 16.3      Creatinine 0.95      GFR Estimate >90      Calcium 9.7      Chloride 100      Glucose 170 (*)     Alkaline Phosphatase 63      AST 74 (*)     ALT 63      Protein Total 7.4      Albumin 4.8      Bilirubin Total 0.6     CBC WITH PLATELETS AND DIFFERENTIAL - Abnormal    WBC Count 12.7 (*)     RBC Count 4.73      Hemoglobin 13.9      Hematocrit 41.1      MCV 87      MCH 29.4      MCHC 33.8      RDW 14.1      Platelet Count 257      % Neutrophils 72      % Lymphocytes 23      % Monocytes 4      % Eosinophils 1      % Basophils 0      % Immature Granulocytes 1      NRBCs per 100 WBC 0      Absolute Neutrophils 9.2 (*)     Absolute Lymphocytes 2.9      Absolute Monocytes 0.5      Absolute Eosinophils 0.1      Absolute Basophils 0.0      Absolute Immature Granulocytes 0.1      Absolute NRBCs 0.0     LIPASE - Normal    Lipase 60         RADIOLOGY:  US Abdomen Limited   Final Result   IMPRESSION:   1.  Cholelithiasis without evidence for cholecystitis.      2.  However, there is dilatation of the common bile duct up to 1.1 cm. Cannot exclude distal common bile duct stone or obstructing process. Correlation with biliary enzymes and consider MRCP for further evaluation.      3.  Incidentally noted is adenomyomatosis of the gallbladder.         MR Abdomen MRCP w/o & w Contrast    (Results Pending)           Ana Maria Grijalva M.D.  Emergency Medicine  [facility]  [facility] EMERGENCY ROOM  [address] [address]  [phone]  Dept: [phone]      Ana Maria Grijalva MD  06/02/24 0622

## 2024-06-02 NOTE — ED NOTES
Plan to transfer for ERCP at [facility] tomorrow and report is given to receiving RN. Patient is aware plan of plan of care.

## 2024-06-02 NOTE — CONSULTS
"[facility] Digestive Health Consultation     Trini Salter  [address] [facility] [address]  53 year old male     Admission Date/Time: 2024  Primary Care Provider: Loy Powell  Referring / Attending Physician:  Nathaniel Montes De Oca     We were asked to see the patient in consultation by Dr. Nathaniel Montes De Oca for evaluation of \"choledocholithiasis.:       CC: abdominal pain     HPI:  Trini Salter is a 53 year old male without significant PMH who presented to ER this morning for epigastric pain. He reports onset of upper abdominal pain around 1 am today with pain similar to what he had in  and Feb of this year. He had an EUS 2024 that showed gallstones but no CBD stones, surgery consult recommended and he is scheduled for an appointment on . Currently, he is not having pain. He denies any associated nausea, vomiting, altered bowel habits, melena/hematochezia or yessy-colored stools. His last BM was last night and was normal.       ROS: A comprehensive ten point review of systems was negative aside from those in mentioned in the HPI.      PAST MEDICAL HISTORY:  Patient Active Problem List    Diagnosis Date Noted    Abdominal pain, epigastric 2024     Priority: Medium    Choledocholithiasis 2024     Priority: Medium    Prediabetes 2022     Priority: Medium     Diagnosed   A1c 6.0      Hyperlipidemia      Priority: Medium     Dx   LDL to 181; low HDL, triglycerides to 275        Folliculitis 2018     Priority: Medium     Amoxicillin daily  Dermatology        External hemorrhoids      Priority: Medium     Miralax         SOCIAL HISTORY:  Social History     Tobacco Use    Smoking status: Former     Current packs/day: 0.00     Types: Cigarettes     Quit date: 9/10/2003     Years since quittin.7    Smokeless tobacco: Never    Tobacco comments:     Light   Substance Use Topics    Alcohol use: No     Comment: Alcoholic Drinks/day:      Drug use: No   Former tobacco, no etoh.     FAMILY " HISTORY:  Family History   Problem Relation Age of Onset    Cancer Mother         Ovarian    Parkinsonism Father     Hypotension Father     Benign prostatic hyperplasia Father     Hyperlipidemia Brother     Dementia Paternal Grandfather     Parkinsonism Paternal Grandfather    No pertinent family history.     ALLERGIES: No Known Allergies  MEDICATIONS:   Current Facility-Administered Medications   Medication Dose Route Frequency Provider Last Rate Last Admin    acetaminophen (TYLENOL) tablet 650 mg  650 mg Oral Q4H PRN Nathaniel Montes De Oca MD        Or    acetaminophen (TYLENOL) Suppository 650 mg  650 mg Rectal Q4H PRN Nathaniel Montes De Oca MD        calcium carbonate (TUMS) chewable tablet 1,000 mg  1,000 mg Oral 4x Daily PRN Nathaniel Montes De Oca MD        HYDROmorphone (DILAUDID) injection 1 mg  1 mg Intravenous Q4H PRN Nathaniel Montes De Oca MD        lidocaine (LMX4) cream   Topical Q1H PRN Nathaniel Montes De Oca MD        lidocaine 1 % 0.1-1 mL  0.1-1 mL Other Q1H PRN Nathaniel Montes De Oca MD        melatonin tablet 5 mg  5 mg Oral At Bedtime PRN Nathaniel Montes De Oca MD        naloxone (NARCAN) injection 0.2 mg  0.2 mg Intravenous Q2 Min PRN Nathaniel Montes De Oca MD        Or    naloxone (NARCAN) injection 0.4 mg  0.4 mg Intravenous Q2 Min PRN Nathaniel Montes De Oca MD        Or    naloxone (NARCAN) injection 0.2 mg  0.2 mg Intramuscular Q2 Min PRN Nathaniel Montes De Oca MD        Or    naloxone (NARCAN) injection 0.4 mg  0.4 mg Intramuscular Q2 Min PRN Nathaniel Montes De Oca MD        ondansetron (ZOFRAN ODT) ODT tab 4 mg  4 mg Oral Q6H PRN Nathaniel Montes De Oca MD        Or    ondansetron (ZOFRAN) injection 4 mg  4 mg Intravenous Q6H PRN Nathaniel Montes De Oca MD        piperacillin-tazobactam (ZOSYN) 3.375 g vial to attach to  mL bag  3.375 g Intravenous Once Nathaniel Montes De Oca MD   3.375 g at 06/02/24 1421    piperacillin-tazobactam (ZOSYN) 3.375 g vial to attach to  mL bag  3.375 g Intravenous Q8H Nathaniel Montes De Oca MD        polyethylene glycol (MIRALAX) Packet  17 g  17 g Oral BID PRN Nathaniel Montes De Oca MD        prochlorperazine (COMPAZINE) injection 10 mg  10 mg Intravenous Q6H PRN Nathaniel Montes De Oca MD        Or    prochlorperazine (COMPAZINE) tablet 10 mg  10 mg Oral Q6H PRN Nathaniel Montes De Oca MD        Or    prochlorperazine (COMPAZINE) suppository 25 mg  25 mg Rectal Q12H PRN Nathaniel Montes De Oca MD        senna-docusate (SENOKOT-S/PERICOLACE) 8.6-50 MG per tablet 1 tablet  1 tablet Oral BID PRN Nathaniel Montes De Oca MD        Or    senna-docusate (SENOKOT-S/PERICOLACE) 8.6-50 MG per tablet 2 tablet  2 tablet Oral BID PRNathaniel uRby MD        sodium chloride (PF) 0.9% PF flush 3 mL  3 mL Intracatheter Q8H Nathaniel Montes De Oca MD        sodium chloride (PF) 0.9% PF flush 3 mL  3 mL Intracatheter q1 min prNathaniel Ruby MD         PHYSICAL EXAM:   /80 (BP Location: Left arm, Patient Position: Semi-Saunders's, Cuff Size: Adult Regular)   Pulse 67   Temp 98.1  F (36.7  C) (Oral)   Resp 18   SpO2 98%    GEN: NAD, male appears stated age sitting up in bed  HEENT: No icterus, no lymphadenopathy  HRT: RRR  LUNGS: CTA  ABD: +BS, soft, nontender  SKIN: No rash, jaundice  MSKL: no LE edema, strength 5/5 all 4 extrems  NEURO: Alert and oriented, appropriate mood and affect     ADDITIONAL DATA:   I reviewed the patient's new clinical lab test results.   Recent Labs   Lab Test 06/02/24  0519 04/03/24  1032   WBC 12.7* 5.0   HGB 13.9 15.1   MCV 87 89    233     Recent Labs   Lab Test 06/02/24  0519 04/03/24  1032 06/26/23  0859   POTASSIUM 3.8 4.7 4.7   CHLORIDE 100 102 102   CO2 26 28 27   BUN 16.3 15.4 15.2   ANIONGAP 12 11 11     Recent Labs   Lab Test 06/02/24  0519 04/03/24  1032 06/26/23  0859   ALBUMIN 4.8 4.8 4.8   BILITOTAL 0.6 0.6 0.6   ALT 63 51 49   AST 74* 42 48*   LIPASE 60 80*  --         Imaging results:  MRCP 6/2/24:  FINDINGS:   LIVER: Noncirrhotic liver morphology. Mild diffuse hepatic steatosis. No focal liver lesions.  GALLBLADDER/BILIARY: Gallbladder is  diffusely edematous, mildly distended, and contains a single gallstone. Mild dilation of the common bile duct up to 0.9 cm with gradual tapering towards the ampulla. There is a 0.2 cm choledocholith within the distal   common bile duct near the ampulla (13/#24). No intrahepatic biliary ductal dilation.  PANCREAS: Multiple tiny cystic lesions within the tail of the pancreas measuring up to 0.7 cm (13/#22). No solid pancreatic mass. No dilation of the main pancreatic duct. Conventional ductal anatomy.  SPLEEN: Normal.  ADRENALS: Normal.  KIDNEYS: Normal.  BOWEL: No bowel obstruction or inflammation. Normal appendix.  LYMPH NODES: No enlarged lymph node. Patent portal, splenic, and superior mesenteric veins. No abdominal aortic aneurysm.  VASCULATURE: Normal.  LUNG BASES: Normal.  MUSCULOSKELETAL: No acute bony abnormality.                                                                   IMPRESSION:  1.  There is a 0.2 cm choledocholith within the distal common bile duct near the ampulla resulting in mild extrahepatic biliary ductal dilation. No intrahepatic biliary ductal dilation.   2.  Cholelithiasis with mild gallbladder distention and diffuse gallbladder wall edema, most likely related to the choledocholith. Other causes of gallbladder wall edema include IV hydration, reactive changes to hepatitis, and cholecystitis.  3.  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 No high-risk features. See follow-up guidelines below.  4.  Mild diffuse hepatic steatosis.    RUQ ultrasound 6/2/24:  FINDINGS:  GALLBLADDER: Cholelithiasis. No gallbladder wall thickening. Negative sonographic Hudson's sign. No evidence for cholecystitis. Ringdown artifact seen in the gallbladder wall compatible with adenomyomatosis.  BILE DUCTS: No intrahepatic biliary dilatation. Common bile duct, however, is dilated up to 1.1 cm.  LIVER: Normal parenchyma with smooth contour. No  focal mass.  RIGHT KIDNEY: No hydronephrosis.  PANCREAS: Limited visualization due to overlying bowel gas.  No ascites.                                                                   IMPRESSION:  1.  Cholelithiasis without evidence for cholecystitis.  2.  However, there is dilatation of the common bile duct up to 1.1 cm. Cannot exclude distal common bile duct stone or obstructing process. Correlation with biliary enzymes and consider MRCP for further evaluation.  3.  Incidentally noted is adenomyomatosis of the gallbladder.    Procedure results:  EUS 4/26/24 (Malik):  Findings:       ENDOSCOPIC FINDING: :        The examined esophagus was endoscopically normal.        The entire examined stomach was endoscopically normal.        The examined duodenum was endoscopically normal.        ENDOSONOGRAPHIC FINDING: :        There was no sign of significant endosonographic abnormality in the        ampulla.        There was no sign of significant endosonographic abnormality in the        common bile duct and in the common hepatic duct. The maximum diameter of        the ducts were 8 mm.        Multiple stones were visualized endosonographically in the gallbladder        body. The stones measured up to 9 mm in greatest dimension. The stones        were round. They were characterized by shadowing.        There was abnormal echogenicity in the visualized portion of the liver.        This area was hyperechoic.        Two diminutive pancreas cysts were seen , 3 mm in size, in the body and        tail. The remainder of the pancreas parenchyma was normal. Pancreatic        duct was 1 mm at the head, body, tail. No divisum.        No lymphadenopathy seen.                                                                                   Impression:   - Gallbladder stones.                          - Fatty liver.                          - Diminutive pancreatic cysts.                          Patients symptoms have resolved  since 2/2024. Most                          likely reflective of symptomatic gallbladder stones.   Recommendation:        - Discharge patient to home.                          - Resume diet.                          - Consideration of cholecystectomy.     ASSESSMENT:    Choledocholithiasis  This is a 54 y/o male without significant PMH admitted earlier today for choledocholithiasis after presenting with epigastric pain that started overnight. Pain is similar to two episodes earlier in the year. EUS in April showed gallstones but no CBD stone/obstruction. Liver tests here with only mild AST elevation but US showed CBD dilation and subsequent MRCP shows choledocholithiasis in addition to mild gallbladder distension and gallbladder wall edema. WBC elevated, no fever, on Zosyn. He will need ERCP for stone removal, discussed process of transfer, procedure, and risks with patient. Will consult general surgery to consider lap alan, timing per surgery but could consider combo case with ERCP tomorrow.     PLAN:  - Clear liquid diet  - Continue abx  - NPO at midnight for ERCP at [facility] 6/3 with Dr. Chase, tentatively at 11:20 AM  - General surgery consult to consider lap alan  - Supportive cares per medicine      Adilia Christiansen PA-C  [facility] Digestive [facility]  6/2/2024 2:39 PM  [phone] (office)    This case was discussed with Dr. Vu who agrees to the above assessment and plan.    40 minutes of total time was spent today providing patient care, including patient evaluation, reviewing documentation/test result, and .   ________________________________________________________________________     Agree with the note written above.  ERCP tomorrow for symptomatic choledocholithiasis.  This should be followed by cholecystectomy during this hospital stay itself.    Dre Vu MD, FACP  [facility] Digestive [facility] ([facility])

## 2024-06-02 NOTE — PLAN OF CARE
Goal Outcome Evaluation:       Vitals stable. Patient denies abdominal pain. No nausea. Up independently in room. ERCP scheduled for tomorrow at 11:20am, transport set up for 0730-8am.       Problem: Pain Acute  Goal: Optimal Pain Control and Function  Outcome: Progressing

## 2024-06-03 ENCOUNTER — APPOINTMENT (OUTPATIENT)
Dept: RADIOLOGY | Facility: HOSPITAL | Age: 54
End: 2024-06-03
Attending: INTERNAL MEDICINE
Payer: COMMERCIAL

## 2024-06-03 ENCOUNTER — ANESTHESIA (OUTPATIENT)
Dept: SURGERY | Facility: HOSPITAL | Age: 54
End: 2024-06-03
Payer: COMMERCIAL

## 2024-06-03 ENCOUNTER — HOSPITAL ENCOUNTER (OUTPATIENT)
Facility: CLINIC | Age: 54
Discharge: HOME | End: 2024-06-04
Attending: HOSPITALIST | Admitting: HOSPITALIST
Payer: COMMERCIAL

## 2024-06-03 ENCOUNTER — HOSPITAL ENCOUNTER (OUTPATIENT)
Facility: HOSPITAL | Age: 54
Discharge: OTHER INSTITUTION | End: 2024-06-03
Attending: INTERNAL MEDICINE | Admitting: INTERNAL MEDICINE
Payer: COMMERCIAL

## 2024-06-03 VITALS
OXYGEN SATURATION: 98 % | RESPIRATION RATE: 16 BRPM | TEMPERATURE: 98 F | DIASTOLIC BLOOD PRESSURE: 97 MMHG | SYSTOLIC BLOOD PRESSURE: 144 MMHG | HEART RATE: 72 BPM

## 2024-06-03 VITALS
DIASTOLIC BLOOD PRESSURE: 86 MMHG | RESPIRATION RATE: 16 BRPM | OXYGEN SATURATION: 100 % | SYSTOLIC BLOOD PRESSURE: 156 MMHG | TEMPERATURE: 98.6 F | HEART RATE: 91 BPM

## 2024-06-03 LAB
ALBUMIN SERPL BCG-MCNC: 4.3 G/DL (ref 3.5–5.2)
ALP SERPL-CCNC: 67 U/L (ref 40–150)
ALT SERPL W P-5'-P-CCNC: 54 U/L (ref 0–70)
ANION GAP SERPL CALCULATED.3IONS-SCNC: 11 MMOL/L (ref 7–15)
AST SERPL W P-5'-P-CCNC: 51 U/L (ref 0–45)
BASOPHILS # BLD AUTO: 0 10E3/UL (ref 0–0.2)
BASOPHILS NFR BLD AUTO: 0 %
BILIRUB DIRECT SERPL-MCNC: 0.3 MG/DL (ref 0–0.3)
BILIRUB SERPL-MCNC: 1.5 MG/DL
BUN SERPL-MCNC: 11.3 MG/DL (ref 6–20)
CALCIUM SERPL-MCNC: 9.3 MG/DL (ref 8.6–10)
CHLORIDE SERPL-SCNC: 104 MMOL/L (ref 98–107)
CREAT SERPL-MCNC: 0.99 MG/DL (ref 0.67–1.17)
DEPRECATED HCO3 PLAS-SCNC: 25 MMOL/L (ref 22–29)
EGFRCR SERPLBLD CKD-EPI 2021: >90 ML/MIN/1.73M2
EOSINOPHIL # BLD AUTO: 0.1 10E3/UL (ref 0–0.7)
EOSINOPHIL NFR BLD AUTO: 1 %
ERCP: NORMAL
ERYTHROCYTE [DISTWIDTH] IN BLOOD BY AUTOMATED COUNT: 14.6 % (ref 10–15)
GLUCOSE BLDC GLUCOMTR-MCNC: 109 MG/DL (ref 70–99)
GLUCOSE BLDC GLUCOMTR-MCNC: 131 MG/DL (ref 70–99)
GLUCOSE SERPL-MCNC: 106 MG/DL (ref 70–99)
HCT VFR BLD AUTO: 43.5 % (ref 40–53)
HGB BLD-MCNC: 14.6 G/DL (ref 13.3–17.7)
IMM GRANULOCYTES # BLD: 0 10E3/UL
IMM GRANULOCYTES NFR BLD: 0 %
LYMPHOCYTES # BLD AUTO: 3.1 10E3/UL (ref 0.8–5.3)
LYMPHOCYTES NFR BLD AUTO: 35 %
MAGNESIUM SERPL-MCNC: 2.1 MG/DL (ref 1.7–2.3)
MCH RBC QN AUTO: 29.4 PG (ref 26.5–33)
MCHC RBC AUTO-ENTMCNC: 33.6 G/DL (ref 31.5–36.5)
MCV RBC AUTO: 88 FL (ref 78–100)
MONOCYTES # BLD AUTO: 0.6 10E3/UL (ref 0–1.3)
MONOCYTES NFR BLD AUTO: 6 %
NEUTROPHILS # BLD AUTO: 5.1 10E3/UL (ref 1.6–8.3)
NEUTROPHILS NFR BLD AUTO: 57 %
NRBC # BLD AUTO: 0 10E3/UL
NRBC BLD AUTO-RTO: 0 /100
PLATELET # BLD AUTO: 249 10E3/UL (ref 150–450)
POTASSIUM SERPL-SCNC: 3.7 MMOL/L (ref 3.4–5.3)
PROT SERPL-MCNC: 6.6 G/DL (ref 6.4–8.3)
RBC # BLD AUTO: 4.96 10E6/UL (ref 4.4–5.9)
SODIUM SERPL-SCNC: 140 MMOL/L (ref 135–145)
WBC # BLD AUTO: 8.9 10E3/UL (ref 4–11)

## 2024-06-03 PROCEDURE — 36415 COLL VENOUS BLD VENIPUNCTURE: CPT | Performed by: HOSPITALIST

## 2024-06-03 PROCEDURE — 83735 ASSAY OF MAGNESIUM: CPT | Performed by: INTERNAL MEDICINE

## 2024-06-03 PROCEDURE — 250N000011 HC RX IP 250 OP 636: Performed by: NURSE ANESTHETIST, CERTIFIED REGISTERED

## 2024-06-03 PROCEDURE — C1726 CATH, BAL DIL, NON-VASCULAR: HCPCS | Performed by: INTERNAL MEDICINE

## 2024-06-03 PROCEDURE — 80053 COMPREHEN METABOLIC PANEL: CPT | Performed by: HOSPITALIST

## 2024-06-03 PROCEDURE — 258N000003 HC RX IP 258 OP 636: Performed by: NURSE ANESTHETIST, CERTIFIED REGISTERED

## 2024-06-03 PROCEDURE — 258N000003 HC RX IP 258 OP 636: Performed by: ANESTHESIOLOGY

## 2024-06-03 PROCEDURE — 250N000011 HC RX IP 250 OP 636: Performed by: ANESTHESIOLOGY

## 2024-06-03 PROCEDURE — 250N000011 HC RX IP 250 OP 636: Performed by: SPECIALIST

## 2024-06-03 PROCEDURE — 88304 TISSUE EXAM BY PATHOLOGIST: CPT | Mod: TC | Performed by: INTERNAL MEDICINE

## 2024-06-03 PROCEDURE — 99222 1ST HOSP IP/OBS MODERATE 55: CPT | Mod: 57

## 2024-06-03 PROCEDURE — 85025 COMPLETE CBC W/AUTO DIFF WBC: CPT | Performed by: HOSPITALIST

## 2024-06-03 PROCEDURE — 370N000017 HC ANESTHESIA TECHNICAL FEE, PER MIN: Performed by: INTERNAL MEDICINE

## 2024-06-03 PROCEDURE — 250N000009 HC RX 250: Performed by: INTERNAL MEDICINE

## 2024-06-03 PROCEDURE — C1769 GUIDE WIRE: HCPCS | Performed by: INTERNAL MEDICINE

## 2024-06-03 PROCEDURE — 360N000083 HC SURGERY LEVEL 3 W/ FLUORO, PER MIN: Performed by: INTERNAL MEDICINE

## 2024-06-03 PROCEDURE — 255N000002 HC RX 255 OP 636: Performed by: INTERNAL MEDICINE

## 2024-06-03 PROCEDURE — 999N000141 HC STATISTIC PRE-PROCEDURE NURSING ASSESSMENT: Performed by: INTERNAL MEDICINE

## 2024-06-03 PROCEDURE — 272N000001 HC OR GENERAL SUPPLY STERILE: Performed by: INTERNAL MEDICINE

## 2024-06-03 PROCEDURE — 88304 TISSUE EXAM BY PATHOLOGIST: CPT | Mod: 26 | Performed by: PATHOLOGY

## 2024-06-03 PROCEDURE — 250N000009 HC RX 250: Performed by: NURSE ANESTHETIST, CERTIFIED REGISTERED

## 2024-06-03 PROCEDURE — 258N000003 HC RX IP 258 OP 636: Performed by: INTERNAL MEDICINE

## 2024-06-03 PROCEDURE — 250N000013 HC RX MED GY IP 250 OP 250 PS 637: Performed by: PHYSICIAN ASSISTANT

## 2024-06-03 PROCEDURE — 47562 LAPAROSCOPIC CHOLECYSTECTOMY: CPT | Performed by: SPECIALIST

## 2024-06-03 PROCEDURE — 999N000180 XR SURGERY CARM FLUORO LESS THAN 5 MIN: Mod: TC

## 2024-06-03 PROCEDURE — 250N000011 HC RX IP 250 OP 636: Performed by: HOSPITALIST

## 2024-06-03 PROCEDURE — 710N000009 HC RECOVERY PHASE 1, LEVEL 1, PER MIN: Performed by: INTERNAL MEDICINE

## 2024-06-03 PROCEDURE — 99232 SBSQ HOSP IP/OBS MODERATE 35: CPT | Performed by: HOSPITALIST

## 2024-06-03 PROCEDURE — 82248 BILIRUBIN DIRECT: CPT | Performed by: INTERNAL MEDICINE

## 2024-06-03 RX ORDER — POLYETHYLENE GLYCOL 3350 17 G/17G
17 POWDER, FOR SOLUTION ORAL DAILY
Status: DISCONTINUED | OUTPATIENT
Start: 2024-06-04 | End: 2024-06-04 | Stop reason: HOSPADM

## 2024-06-03 RX ORDER — ONDANSETRON 2 MG/ML
4 INJECTION INTRAMUSCULAR; INTRAVENOUS EVERY 6 HOURS PRN
Status: DISCONTINUED | OUTPATIENT
Start: 2024-06-03 | End: 2024-06-04 | Stop reason: HOSPADM

## 2024-06-03 RX ORDER — AMOXICILLIN 250 MG
1 CAPSULE ORAL 2 TIMES DAILY
Status: CANCELLED | OUTPATIENT
Start: 2024-06-03

## 2024-06-03 RX ORDER — AMOXICILLIN 250 MG
1 CAPSULE ORAL 2 TIMES DAILY
Status: DISCONTINUED | OUTPATIENT
Start: 2024-06-03 | End: 2024-06-04 | Stop reason: HOSPADM

## 2024-06-03 RX ORDER — ONDANSETRON 2 MG/ML
4 INJECTION INTRAMUSCULAR; INTRAVENOUS EVERY 6 HOURS PRN
Status: CANCELLED | OUTPATIENT
Start: 2024-06-03

## 2024-06-03 RX ORDER — PROCHLORPERAZINE MALEATE 10 MG
10 TABLET ORAL EVERY 6 HOURS PRN
Status: CANCELLED | OUTPATIENT
Start: 2024-06-03

## 2024-06-03 RX ORDER — FENTANYL CITRATE 50 UG/ML
INJECTION, SOLUTION INTRAMUSCULAR; INTRAVENOUS PRN
Status: DISCONTINUED | OUTPATIENT
Start: 2024-06-03 | End: 2024-06-03

## 2024-06-03 RX ORDER — FENTANYL CITRATE 50 UG/ML
50 INJECTION, SOLUTION INTRAMUSCULAR; INTRAVENOUS EVERY 5 MIN PRN
Status: DISCONTINUED | OUTPATIENT
Start: 2024-06-03 | End: 2024-06-03 | Stop reason: HOSPADM

## 2024-06-03 RX ORDER — ACETAMINOPHEN 325 MG/1
975 TABLET ORAL EVERY 8 HOURS
Status: DISCONTINUED | OUTPATIENT
Start: 2024-06-03 | End: 2024-06-04 | Stop reason: HOSPADM

## 2024-06-03 RX ORDER — POLYETHYLENE GLYCOL 3350 17 G/17G
17 POWDER, FOR SOLUTION ORAL 2 TIMES DAILY PRN
Status: CANCELLED | OUTPATIENT
Start: 2024-06-03

## 2024-06-03 RX ORDER — SODIUM CHLORIDE, SODIUM LACTATE, POTASSIUM CHLORIDE, CALCIUM CHLORIDE 600; 310; 30; 20 MG/100ML; MG/100ML; MG/100ML; MG/100ML
INJECTION, SOLUTION INTRAVENOUS CONTINUOUS
Status: DISCONTINUED | OUTPATIENT
Start: 2024-06-03 | End: 2024-06-03 | Stop reason: HOSPADM

## 2024-06-03 RX ORDER — ACETAMINOPHEN 325 MG/1
975 TABLET ORAL EVERY 8 HOURS
Status: CANCELLED | OUTPATIENT
Start: 2024-06-03 | End: 2024-06-06

## 2024-06-03 RX ORDER — NALOXONE HYDROCHLORIDE 0.4 MG/ML
0.2 INJECTION, SOLUTION INTRAMUSCULAR; INTRAVENOUS; SUBCUTANEOUS
Status: CANCELLED | OUTPATIENT
Start: 2024-06-03

## 2024-06-03 RX ORDER — ACETAMINOPHEN 650 MG/1
650 SUPPOSITORY RECTAL EVERY 4 HOURS PRN
Status: CANCELLED | OUTPATIENT
Start: 2024-06-03

## 2024-06-03 RX ORDER — OXYCODONE HYDROCHLORIDE 5 MG/1
10 TABLET ORAL
Status: DISCONTINUED | OUTPATIENT
Start: 2024-06-03 | End: 2024-06-03 | Stop reason: HOSPADM

## 2024-06-03 RX ORDER — OXYCODONE HYDROCHLORIDE 5 MG/1
5 TABLET ORAL EVERY 4 HOURS PRN
Status: CANCELLED | OUTPATIENT
Start: 2024-06-03

## 2024-06-03 RX ORDER — ACETAMINOPHEN 325 MG/1
650 TABLET ORAL EVERY 4 HOURS PRN
Status: DISCONTINUED | OUTPATIENT
Start: 2024-06-06 | End: 2024-06-04 | Stop reason: HOSPADM

## 2024-06-03 RX ORDER — ONDANSETRON 4 MG/1
4 TABLET, ORALLY DISINTEGRATING ORAL EVERY 30 MIN PRN
Status: DISCONTINUED | OUTPATIENT
Start: 2024-06-03 | End: 2024-06-03 | Stop reason: HOSPADM

## 2024-06-03 RX ORDER — POLYETHYLENE GLYCOL 3350 17 G/17G
17 POWDER, FOR SOLUTION ORAL DAILY
Status: CANCELLED | OUTPATIENT
Start: 2024-06-04

## 2024-06-03 RX ORDER — HYDROMORPHONE HCL IN WATER/PF 6 MG/30 ML
0.2 PATIENT CONTROLLED ANALGESIA SYRINGE INTRAVENOUS EVERY 5 MIN PRN
Status: DISCONTINUED | OUTPATIENT
Start: 2024-06-03 | End: 2024-06-03 | Stop reason: HOSPADM

## 2024-06-03 RX ORDER — ACETAMINOPHEN 325 MG/1
650 TABLET ORAL EVERY 4 HOURS PRN
Status: CANCELLED | OUTPATIENT
Start: 2024-06-06

## 2024-06-03 RX ORDER — NALOXONE HYDROCHLORIDE 0.4 MG/ML
0.1 INJECTION, SOLUTION INTRAMUSCULAR; INTRAVENOUS; SUBCUTANEOUS
Status: DISCONTINUED | OUTPATIENT
Start: 2024-06-03 | End: 2024-06-03 | Stop reason: HOSPADM

## 2024-06-03 RX ORDER — KETOROLAC TROMETHAMINE 30 MG/ML
INJECTION, SOLUTION INTRAMUSCULAR; INTRAVENOUS PRN
Status: DISCONTINUED | OUTPATIENT
Start: 2024-06-03 | End: 2024-06-03

## 2024-06-03 RX ORDER — NALOXONE HYDROCHLORIDE 0.4 MG/ML
0.2 INJECTION, SOLUTION INTRAMUSCULAR; INTRAVENOUS; SUBCUTANEOUS
Status: DISCONTINUED | OUTPATIENT
Start: 2024-06-03 | End: 2024-06-04 | Stop reason: HOSPADM

## 2024-06-03 RX ORDER — NALOXONE HYDROCHLORIDE 0.4 MG/ML
0.4 INJECTION, SOLUTION INTRAMUSCULAR; INTRAVENOUS; SUBCUTANEOUS
Status: CANCELLED | OUTPATIENT
Start: 2024-06-03

## 2024-06-03 RX ORDER — ONDANSETRON 2 MG/ML
4 INJECTION INTRAMUSCULAR; INTRAVENOUS EVERY 30 MIN PRN
Status: DISCONTINUED | OUTPATIENT
Start: 2024-06-03 | End: 2024-06-03 | Stop reason: HOSPADM

## 2024-06-03 RX ORDER — OXYCODONE HYDROCHLORIDE 5 MG/1
10 TABLET ORAL EVERY 4 HOURS PRN
Status: DISCONTINUED | OUTPATIENT
Start: 2024-06-03 | End: 2024-06-04 | Stop reason: HOSPADM

## 2024-06-03 RX ORDER — PROCHLORPERAZINE MALEATE 10 MG
10 TABLET ORAL EVERY 6 HOURS PRN
Status: DISCONTINUED | OUTPATIENT
Start: 2024-06-03 | End: 2024-06-04 | Stop reason: HOSPADM

## 2024-06-03 RX ORDER — LIDOCAINE HYDROCHLORIDE 10 MG/ML
INJECTION, SOLUTION INFILTRATION; PERINEURAL PRN
Status: DISCONTINUED | OUTPATIENT
Start: 2024-06-03 | End: 2024-06-03

## 2024-06-03 RX ORDER — HYDROMORPHONE HCL IN WATER/PF 6 MG/30 ML
0.4 PATIENT CONTROLLED ANALGESIA SYRINGE INTRAVENOUS EVERY 5 MIN PRN
Status: DISCONTINUED | OUTPATIENT
Start: 2024-06-03 | End: 2024-06-03 | Stop reason: HOSPADM

## 2024-06-03 RX ORDER — ONDANSETRON 4 MG/1
4 TABLET, ORALLY DISINTEGRATING ORAL EVERY 6 HOURS PRN
Status: CANCELLED | OUTPATIENT
Start: 2024-06-03

## 2024-06-03 RX ORDER — LIDOCAINE 40 MG/G
CREAM TOPICAL
Status: DISCONTINUED | OUTPATIENT
Start: 2024-06-03 | End: 2024-06-04 | Stop reason: HOSPADM

## 2024-06-03 RX ORDER — ONDANSETRON 2 MG/ML
INJECTION INTRAMUSCULAR; INTRAVENOUS PRN
Status: DISCONTINUED | OUTPATIENT
Start: 2024-06-03 | End: 2024-06-03

## 2024-06-03 RX ORDER — CEFAZOLIN SODIUM/WATER 2 G/20 ML
SYRINGE (ML) INTRAVENOUS PRN
Status: DISCONTINUED | OUTPATIENT
Start: 2024-06-03 | End: 2024-06-03

## 2024-06-03 RX ORDER — OXYCODONE HYDROCHLORIDE 5 MG/1
5 TABLET ORAL
Status: DISCONTINUED | OUTPATIENT
Start: 2024-06-03 | End: 2024-06-03 | Stop reason: HOSPADM

## 2024-06-03 RX ORDER — HYDROMORPHONE HCL IN WATER/PF 6 MG/30 ML
0.2 PATIENT CONTROLLED ANALGESIA SYRINGE INTRAVENOUS
Status: DISCONTINUED | OUTPATIENT
Start: 2024-06-03 | End: 2024-06-04 | Stop reason: HOSPADM

## 2024-06-03 RX ORDER — CALCIUM CARBONATE 500 MG/1
1000 TABLET, CHEWABLE ORAL 4 TIMES DAILY PRN
Status: CANCELLED | OUTPATIENT
Start: 2024-06-03

## 2024-06-03 RX ORDER — PROPOFOL 10 MG/ML
INJECTION, EMULSION INTRAVENOUS PRN
Status: DISCONTINUED | OUTPATIENT
Start: 2024-06-03 | End: 2024-06-03

## 2024-06-03 RX ORDER — NALOXONE HYDROCHLORIDE 0.4 MG/ML
0.4 INJECTION, SOLUTION INTRAMUSCULAR; INTRAVENOUS; SUBCUTANEOUS
Status: DISCONTINUED | OUTPATIENT
Start: 2024-06-03 | End: 2024-06-04 | Stop reason: HOSPADM

## 2024-06-03 RX ORDER — DEXAMETHASONE SODIUM PHOSPHATE 4 MG/ML
4 INJECTION, SOLUTION INTRA-ARTICULAR; INTRALESIONAL; INTRAMUSCULAR; INTRAVENOUS; SOFT TISSUE
Status: DISCONTINUED | OUTPATIENT
Start: 2024-06-03 | End: 2024-06-03 | Stop reason: HOSPADM

## 2024-06-03 RX ORDER — HYDROMORPHONE HCL IN WATER/PF 6 MG/30 ML
0.4 PATIENT CONTROLLED ANALGESIA SYRINGE INTRAVENOUS
Status: DISCONTINUED | OUTPATIENT
Start: 2024-06-03 | End: 2024-06-04 | Stop reason: HOSPADM

## 2024-06-03 RX ORDER — ONDANSETRON 4 MG/1
4 TABLET, ORALLY DISINTEGRATING ORAL EVERY 6 HOURS PRN
Status: DISCONTINUED | OUTPATIENT
Start: 2024-06-03 | End: 2024-06-04 | Stop reason: HOSPADM

## 2024-06-03 RX ORDER — SODIUM CHLORIDE 9 MG/ML
INJECTION, SOLUTION INTRAVENOUS CONTINUOUS
Status: CANCELLED | OUTPATIENT
Start: 2024-06-03

## 2024-06-03 RX ORDER — AMOXICILLIN 250 MG
1 CAPSULE ORAL 2 TIMES DAILY PRN
Status: CANCELLED | OUTPATIENT
Start: 2024-06-03

## 2024-06-03 RX ORDER — OXYCODONE HYDROCHLORIDE 5 MG/1
5 TABLET ORAL EVERY 4 HOURS PRN
Status: DISCONTINUED | OUTPATIENT
Start: 2024-06-03 | End: 2024-06-04 | Stop reason: HOSPADM

## 2024-06-03 RX ORDER — LIDOCAINE 40 MG/G
CREAM TOPICAL
Status: DISCONTINUED | OUTPATIENT
Start: 2024-06-03 | End: 2024-06-03 | Stop reason: HOSPADM

## 2024-06-03 RX ORDER — LIDOCAINE 40 MG/G
CREAM TOPICAL
Status: CANCELLED | OUTPATIENT
Start: 2024-06-03

## 2024-06-03 RX ORDER — PROCHLORPERAZINE 25 MG
25 SUPPOSITORY, RECTAL RECTAL EVERY 12 HOURS PRN
Status: CANCELLED | OUTPATIENT
Start: 2024-06-03

## 2024-06-03 RX ORDER — BUPIVACAINE HYDROCHLORIDE 2.5 MG/ML
INJECTION, SOLUTION INFILTRATION; PERINEURAL PRN
Status: DISCONTINUED | OUTPATIENT
Start: 2024-06-03 | End: 2024-06-03 | Stop reason: HOSPADM

## 2024-06-03 RX ORDER — OXYCODONE HYDROCHLORIDE 5 MG/1
10 TABLET ORAL EVERY 4 HOURS PRN
Status: CANCELLED | OUTPATIENT
Start: 2024-06-03

## 2024-06-03 RX ORDER — DEXAMETHASONE SODIUM PHOSPHATE 10 MG/ML
INJECTION, SOLUTION INTRAMUSCULAR; INTRAVENOUS PRN
Status: DISCONTINUED | OUTPATIENT
Start: 2024-06-03 | End: 2024-06-03

## 2024-06-03 RX ORDER — INDOMETHACIN 50 MG/1
SUPPOSITORY RECTAL PRN
Status: DISCONTINUED | OUTPATIENT
Start: 2024-06-03 | End: 2024-06-03 | Stop reason: HOSPADM

## 2024-06-03 RX ORDER — FENTANYL CITRATE 50 UG/ML
25 INJECTION, SOLUTION INTRAMUSCULAR; INTRAVENOUS EVERY 5 MIN PRN
Status: DISCONTINUED | OUTPATIENT
Start: 2024-06-03 | End: 2024-06-03 | Stop reason: HOSPADM

## 2024-06-03 RX ORDER — BISACODYL 10 MG
10 SUPPOSITORY, RECTAL RECTAL DAILY PRN
Status: CANCELLED | OUTPATIENT
Start: 2024-06-06

## 2024-06-03 RX ORDER — AMOXICILLIN 250 MG
2 CAPSULE ORAL 2 TIMES DAILY PRN
Status: CANCELLED | OUTPATIENT
Start: 2024-06-03

## 2024-06-03 RX ORDER — BISACODYL 10 MG
10 SUPPOSITORY, RECTAL RECTAL DAILY PRN
Status: DISCONTINUED | OUTPATIENT
Start: 2024-06-06 | End: 2024-06-04 | Stop reason: HOSPADM

## 2024-06-03 RX ORDER — ACETAMINOPHEN 325 MG/1
650 TABLET ORAL EVERY 4 HOURS PRN
Status: CANCELLED | OUTPATIENT
Start: 2024-06-03

## 2024-06-03 RX ORDER — PIPERACILLIN SODIUM, TAZOBACTAM SODIUM 3; .375 G/15ML; G/15ML
3.38 INJECTION, POWDER, LYOPHILIZED, FOR SOLUTION INTRAVENOUS EVERY 8 HOURS
Status: CANCELLED | OUTPATIENT
Start: 2024-06-03

## 2024-06-03 RX ORDER — INDOMETHACIN 50 MG/1
100 SUPPOSITORY RECTAL
Status: DISCONTINUED | OUTPATIENT
Start: 2024-06-03 | End: 2024-06-03 | Stop reason: HOSPADM

## 2024-06-03 RX ADMIN — ACETAMINOPHEN 975 MG: 325 TABLET ORAL at 16:33

## 2024-06-03 RX ADMIN — KETOROLAC TROMETHAMINE 30 MG: 30 INJECTION, SOLUTION INTRAMUSCULAR at 12:22

## 2024-06-03 RX ADMIN — FENTANYL CITRATE 25 MCG: 50 INJECTION, SOLUTION INTRAMUSCULAR; INTRAVENOUS at 14:07

## 2024-06-03 RX ADMIN — SODIUM CHLORIDE, POTASSIUM CHLORIDE, SODIUM LACTATE AND CALCIUM CHLORIDE: 600; 310; 30; 20 INJECTION, SOLUTION INTRAVENOUS at 11:39

## 2024-06-03 RX ADMIN — FENTANYL CITRATE 50 MCG: 50 INJECTION INTRAMUSCULAR; INTRAVENOUS at 10:14

## 2024-06-03 RX ADMIN — MIDAZOLAM 2 MG: 1 INJECTION INTRAMUSCULAR; INTRAVENOUS at 10:08

## 2024-06-03 RX ADMIN — SODIUM CHLORIDE, POTASSIUM CHLORIDE, SODIUM LACTATE AND CALCIUM CHLORIDE: 600; 310; 30; 20 INJECTION, SOLUTION INTRAVENOUS at 08:58

## 2024-06-03 RX ADMIN — LIDOCAINE HYDROCHLORIDE 5 ML: 10 INJECTION, SOLUTION INFILTRATION; PERINEURAL at 10:14

## 2024-06-03 RX ADMIN — PHENYLEPHRINE HYDROCHLORIDE 100 MCG: 10 INJECTION INTRAVENOUS at 11:54

## 2024-06-03 RX ADMIN — DOCUSATE SODIUM AND SENNOSIDES 1 TABLET: 8.6; 5 TABLET ORAL at 20:51

## 2024-06-03 RX ADMIN — PROPOFOL 200 MCG/KG/MIN: 10 INJECTION, EMULSION INTRAVENOUS at 10:15

## 2024-06-03 RX ADMIN — FENTANYL CITRATE 50 MCG: 50 INJECTION INTRAMUSCULAR; INTRAVENOUS at 10:30

## 2024-06-03 RX ADMIN — DEXAMETHASONE SODIUM PHOSPHATE 10 MG: 10 INJECTION, SOLUTION INTRAMUSCULAR; INTRAVENOUS at 10:14

## 2024-06-03 RX ADMIN — PHENYLEPHRINE HYDROCHLORIDE 100 MCG: 10 INJECTION INTRAVENOUS at 10:42

## 2024-06-03 RX ADMIN — ONDANSETRON 4 MG: 2 INJECTION INTRAMUSCULAR; INTRAVENOUS at 10:20

## 2024-06-03 RX ADMIN — PHENYLEPHRINE HYDROCHLORIDE 100 MCG: 10 INJECTION INTRAVENOUS at 11:42

## 2024-06-03 RX ADMIN — SODIUM CHLORIDE: 9 INJECTION, SOLUTION INTRAVENOUS at 00:12

## 2024-06-03 RX ADMIN — Medication 2 G: at 10:11

## 2024-06-03 RX ADMIN — ACETAMINOPHEN 975 MG: 325 TABLET ORAL at 23:43

## 2024-06-03 RX ADMIN — SUGAMMADEX 150 MG: 100 INJECTION, SOLUTION INTRAVENOUS at 12:25

## 2024-06-03 RX ADMIN — PIPERACILLIN AND TAZOBACTAM 3.38 G: 3; .375 INJECTION, POWDER, FOR SOLUTION INTRAVENOUS at 04:11

## 2024-06-03 RX ADMIN — ROCURONIUM BROMIDE 50 MG: 50 INJECTION, SOLUTION INTRAVENOUS at 10:14

## 2024-06-03 RX ADMIN — PROPOFOL 130 MG: 10 INJECTION, EMULSION INTRAVENOUS at 10:14

## 2024-06-03 RX ADMIN — PHENYLEPHRINE HYDROCHLORIDE 100 MCG: 10 INJECTION INTRAVENOUS at 11:57

## 2024-06-03 RX ADMIN — PHENYLEPHRINE HYDROCHLORIDE 100 MCG: 10 INJECTION INTRAVENOUS at 10:39

## 2024-06-03 ASSESSMENT — ACTIVITIES OF DAILY LIVING (ADL)
ADLS_ACUITY_SCORE: 35
ADLS_ACUITY_SCORE: 20
ADLS_ACUITY_SCORE: 20
ADLS_ACUITY_SCORE: 38
ADLS_ACUITY_SCORE: 39
ADLS_ACUITY_SCORE: 20
ADLS_ACUITY_SCORE: 35
ADLS_ACUITY_SCORE: 20
ADLS_ACUITY_SCORE: 35
ADLS_ACUITY_SCORE: 38
ADLS_ACUITY_SCORE: 20
ADLS_ACUITY_SCORE: 38
ADLS_ACUITY_SCORE: 35
ADLS_ACUITY_SCORE: 35
ADLS_ACUITY_SCORE: 38
ADLS_ACUITY_SCORE: 35
ADLS_ACUITY_SCORE: 39
ADLS_ACUITY_SCORE: 20
ADLS_ACUITY_SCORE: 20
ADLS_ACUITY_SCORE: 35
ADLS_ACUITY_SCORE: 20
ADLS_ACUITY_SCORE: 38
ADLS_ACUITY_SCORE: 38

## 2024-06-03 ASSESSMENT — LIFESTYLE VARIABLES: TOBACCO_USE: 1

## 2024-06-03 ASSESSMENT — COPD QUESTIONNAIRES: COPD: 0

## 2024-06-03 ASSESSMENT — ENCOUNTER SYMPTOMS
SEIZURES: 0
DYSRHYTHMIAS: 0

## 2024-06-03 NOTE — ANESTHESIA CARE TRANSFER NOTE
Patient: Trini Salter    Procedure: Procedure(s):  ENDOSCOPIC RETROGRADE CHOLANGIOPANCREATOGRAPHY, BILIARY SPHINCTEROTOMY, STONE REMOVAL  CHOLECYSTECTOMY, LAPAROSCOPIC       Diagnosis: Choledocholithiasis [K80.50]  Diagnosis Additional Information: No value filed.    Anesthesia Type:   General     Note:    Oropharynx: oropharynx clear of all foreign objects and spontaneously breathing  Level of Consciousness: unresponsive  Oxygen Supplementation: nasal cannula  Level of Supplemental Oxygen (L/min / FiO2): 4  Independent Airway: airway patency satisfactory and stable  Dentition: dentition unchanged  Vital Signs Stable: post-procedure vital signs reviewed and stable  Report to RN Given: handoff report given  Patient transferred to: PACU  Comments: Grimice to jaw thrust.  Good air exchange  Handoff Report: Identifed the Patient, Identified the Reponsible Provider, Reviewed the pertinent medical history, Discussed the surgical course, Reviewed Intra-OP anesthesia mangement and issues during anesthesia, Set expectations for post-procedure period and Allowed opportunity for questions and acknowledgement of understanding  Vitals:  Vitals Value Taken Time   BP     Temp     Pulse     Resp     SpO2         Electronically Signed By: JUSTINO Mayes CRNA  Sol 3, 2024  12:44 PM

## 2024-06-03 NOTE — PLAN OF CARE
Problem: Adult Inpatient Plan of Care  Goal: Optimal Comfort and Wellbeing  Outcome: Progressing   Goal Outcome Evaluation:    Lap sites intact, VSS. Independently ambulating, capnography discontinued - ok'd per provider. Diet advanced to full liquids. Denies pain. Room air. Afebrile.

## 2024-06-03 NOTE — OP NOTE
Operative Note    Name:  Trini Salter  PCP:  Loy Powell  Procedure Date:  6/3/2024       Procedure:  Procedure(s):  ENDOSCOPIC RETROGRADE CHOLANGIOPANCREATOGRAPHY, BILIARY SPHINCTEROTOMY, STONE REMOVAL  CHOLECYSTECTOMY, LAPAROSCOPIC     Pre-Procedure Diagnosis:  Choledocholithiasis [K80.50]     Post-Procedure Diagnosis:    Same     Surgeon(s):  Destiny Hummel MD Go, Castro Sal MD     Assistant: Ruby Hidalgo PA-C      Anesthesia Type:  General       Findings:  Somewhat distended gallbladder with stones.    Operative Report:    The patient was properly identified and brought to the operating suite where they were placed in the supine position, general anesthetic was administered and prepped and draped in a sterile fashion.  A small incision was made below the umbilicus and a Veress needle passed into the abdominal cavity which was insufflated with carbon dioxide.  A 5mm trocar port was then placed followed by the camera.  Under direct vision a 10 mm port was placed in the epigastrium and two 5 mm ports in the right upper quadrant.  The gallbladder was visualized.  With traction on the gallbladder dissection was carried out in the triangle of Calot where the cystic duct and artery were carefully  identified, dissected free, clipped and divided.  The gallbladder was removed from the gallbladder bed with electrocautery with no difficulty and pulled up through the epigastric incision.  The port was replaced and the entire area irrigated until clear.  Hemostasis was assured.  All the ports removed and each site closed with subcuticular sutures of 4-0 Monocryl.  Sterile dressings were placed.  Each site was also infiltrated with quarter percent Marcaine for a total of 30 mL.  The patient tolerated the procedure well.  The procedure was turned over to Dr. Chase to perform ERCP.  My assistant, Ruby Hidalgo, PAC provided exposure and retraction throughout the case.    Estimated Blood Loss:    10cc    Specimens:    ID Type Source Tests Collected by Time Destination   1 : Gallbladder Tissue Gallbladder SURGICAL PATHOLOGY EXAM Go, Castro Sal MD 6/3/2024 10:44 AM            Drains:        Complications:    None    Destiny Hummel MD     Date: 6/3/2024  Time: 12:33 PM

## 2024-06-03 NOTE — PROGRESS NOTES
[facility]    Medicine Progress Note - Hospitalist Service    Date of Admission:  6/2/2024    Assessment & Plan      Trini Salter is a 53 year old male admitted on 6/2/2024. He presents with acute abdominal pain and found to have choledocholithiasis. Per SOC, no beds at [facility], admit to [facility], keep at [facility] and on 6/3/24 patient will go to [facility] for ERCP and then back to [facility].     Choledocholithiasis  -GI appreciated  -Will go to [facility] this morning (discharge readmit orders signed) and then back to [facility], per plan arranged by ED MD, SOC, and on-call GI on 6/2.  -Pain control  -Antibiotic ppx          Diet: NPO per Anesthesia Guidelines for Procedure/Surgery Except for: Meds    DVT Prophylaxis: Pneumatic Compression Devices, Anti-embolisim stockings (TEDs), and VTE Prophylaxis contraindicated due to invasive procedure now this morning  Vang Catheter: Not present  Lines: None     Cardiac Monitoring: None  Code Status: Full Code      Clinically Significant Risk Factors Present on Admission                                  Disposition Plan     Medically Ready for Discharge: Anticipated Tomorrow             Nathaniel Montes De Oca MD  Hospitalist Service  [facility]  Securely message with [employer] (more info)  Text page via [employer] Paging/Directory   ______________________________________________________________________    Interval History   No acute events overnight.    No report of chest pain, shortness of breath, or other new complaints reported.     Physical Exam   Vital Signs: Temp: 98.6  F (37  C) Temp src: Oral BP: (!) 156/86 Pulse: 91   Resp: 16 SpO2: 100 % O2 Device: None (Room air)    Weight: 0 lbs 0 oz        Medical Decision Making       26 MINUTES SPENT BY ME on the date of service doing chart review, history, exam, documentation & further activities per the note.      Data         Imaging results reviewed over the past 24 hrs:   Recent  Results (from the past 24 hour(s))   MR Abdomen MRCP w/o & w Contrast    Narrative    EXAM: MR ABDOMEN MRCP W/O and W CONTRAST  LOCATION: [facility]  DATE: 6/2/2024    INDICATION: Right upper quadrant pain. Dilated common bile duct on ultrasound.  COMPARISON: Ultrasound 6/2/2024.  TECHNIQUE: Routine MR liver/pancreas protocol including axial and coronal MRCP sequences. 2D and 3D reconstruction performed by MR technologist including MIP reconstruction and slab cholangiograms. If performed with contrast, additional dynamic T1 post   IV contrast images.  CONTRAST: Gadavist 7 mL     FINDINGS:     LIVER: Noncirrhotic liver morphology. Mild diffuse hepatic steatosis. No focal liver lesions.    GALLBLADDER/BILIARY: Gallbladder is diffusely edematous, mildly distended, and contains a single gallstone. Mild dilation of the common bile duct up to 0.9 cm with gradual tapering towards the ampulla. There is a 0.2 cm choledocholith within the distal   common bile duct near the ampulla (13/#24). No intrahepatic biliary ductal dilation.    PANCREAS: Multiple tiny cystic lesions within the tail of the pancreas measuring up to 0.7 cm (13/#22). No solid pancreatic mass. No dilation of the main pancreatic duct. Conventional ductal anatomy.    SPLEEN: Normal.    ADRENALS: Normal.    KIDNEYS: Normal.    BOWEL: No bowel obstruction or inflammation. Normal appendix.    LYMPH NODES: No enlarged lymph node. Patent portal, splenic, and superior mesenteric veins. No abdominal aortic aneurysm.    VASCULATURE: Normal.    LUNG BASES: Normal.    MUSCULOSKELETAL: No acute bony abnormality.      Impression    IMPRESSION:    1.  There is a 0.2 cm choledocholith within the distal common bile duct near the ampulla resulting in mild extrahepatic biliary ductal dilation. No intrahepatic biliary ductal dilation.     2.  Cholelithiasis with mild gallbladder distention and diffuse gallbladder wall edema, most likely related to the  choledocholith. Other causes of gallbladder wall edema include IV hydration, reactive changes to hepatitis, and cholecystitis.    3.  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 No high-risk features. See follow-up guidelines below.    4.  Mild diffuse hepatic steatosis.    REFERENCE:  Revisions of international consensus [place] guidelines for the management of IPMN of the pancreas. Pancreatology 2017;17(5):738-753.    Largest cyst less than 10 mm: CT or MRI/MRCP in 6 months and then every 2 years if no change.

## 2024-06-03 NOTE — PLAN OF CARE
Gave report to transport EMT Gayla. Patient belongings and NS with Zosyn sent with patient. IV saline locked. Transferred to [facility] at approximately 0800.

## 2024-06-03 NOTE — DISCHARGE INSTRUCTIONS
From your Surgeon:    Follow up:  For straightforward laparoscopic procedures, our practice is to check-in with you over the phone a few days after your procedure.  If you would like a scheduled follow up appointment in clinic, please call us at [phone] to schedule an appointment at your convenience.  If you would prefer to follow up with us further by phone, please let us know so that we may contact you 2-3 weeks following your procedure.        Diet: Regular diet. Patients can have difficulty with constipation following surgery, due in part to the administration of narcotic pain medications.  If you are suffering with constipation, you should avoid foods such as hard cheeses or red meat.  Foods high in fiber are recommended.      Activity: You should continue to be active at home, including getting up and walking frequently.  If possible, limit the amount of time spent in bed.    Restrictions: You should not lift greater than 20 pounds for 2 weeks, and will want to avoid strenuous physical activity for 1-2 weeks.  You should limit your physical activity if it causes you discomfort; however, this should resolve within 1-2 weeks.   Walking does not count as strenuous physical activity and you are safe to walk up and down stairs.  Following 2 weeks you may resume normal physical activity.    Work:  You can return to work once your surgical pain has resolved.  If you perform duties that require lifting, pushing or pulling anything greater than 15 pounds, you should be on light duty for the immediate 2 weeks after your surgery (if your work allows light duty).  After 2 weeks, you can return to work without restrictions.    Wound care: You may remove your Band-aids after 24 hours. The small white strips on the incisions act like artificial scabs, and will begin to peel at the edges at around 5-7 days. These can then be removed.  It is normal to have a small rim of red present around the incisions. This should not,  however, extend beyond 1/4 inch from the incision.  If your incisions become increasingly tender, red, or draining, please contact us.       Bathing: You may shower after 24 hours from surgery.  It is ok to get your incisions wet, but avoid rubbing them.  Avoid soaking in bath tubs or swimming in lakes, pools, or streams for 2 weeks following surgery.

## 2024-06-03 NOTE — CONSULTS
General Surgery Consultation  Trini Salter MRN# [id number]   Age/Sex: 53 year old male YOB: 1970     Reason for consult: No diagnosis found.        Requesting physician: Adilia Christiansen PA-C with GI                  Assessment and Plan:   Assessment:  Trini Salter is a 53-year-old male with PMH folliculitis on amoxiillin intermittently, ERCP and EUS 4/26/24 for RUQ pain no choledocholithiasis found, schedule for surgery appt. 6/5 but presenting with RUQ pain again. US with cholelithiasis and adenomyomatosis, MRCP with choledocholithiasis. Patient is  afebrile with stable vitals, leukocytosis resolved, slightly elevated bilirubin today. Plan for combo procedure at [facility] today.    Plan:  -NPO  -Activity as tolerated  -Heading to [facility] for ERCP/alan procedure today, then back to   -Okay for clears ADAT per surgery after procedure, defer to GI instructions  -Medical management per primary team          Chief Complaint:   No chief complaint on file.       History is obtained from the patient    HPI:   Trini Salter is a 53 year old male who presents with RUQ pain radiating to epigastric area.  Patient states he also has had this pain in January and February of this year.  Patient has had an ERCP/EUS procedure on 624 which was relatively unimpressive gallstones.  Patient states the pain comes on relatively suddenly and at this time came on at 0100 yesterday. he continues to have bowel movements most recently today which was relatively light in color but not acholic.  Patient is continuing to pass flatus.  Denies nausea, vomiting, or, chills.  Last meal prior to yesterday.  Had clear liquids yesterday which she tolerated well.     Patient has had a negative colonoscopy in the past.  No prior abdominal surgeries.  Takes amoxicillin intermittently folliculitis scalp.  Has taken this for a year maybe.  Does not have prediabetes medications, ast A1c 6%, 4/3/2024.          Past Medical  History:     Past Medical History:   Diagnosis Date    External hemorrhoids     Folliculitis     Hyperlipidemia               Past Surgical History:     Past Surgical History:   Procedure Laterality Date    ESOPHAGOSCOPY, GASTROSCOPY, DUODENOSCOPY (EGD), COMBINED N/A 4/26/2024    Procedure: ENDOSCOPIC ULTRASOUND;  Surgeon: Dmitriy Gan MD;  Location: [facility] Main OR             Social History:    reports that he quit smoking about 20 years ago. His smoking use included cigarettes. He has never used smokeless tobacco. He reports that he does not drink alcohol and does not use drugs.           Family History:     Family History   Problem Relation Age of Onset    Cancer Mother         Ovarian    Parkinsonism Father     Hypotension Father     Benign prostatic hyperplasia Father     Hyperlipidemia Brother     Dementia Paternal Grandfather     Parkinsonism Paternal Grandfather               Allergies:   No Known Allergies           Medications:     Prior to Admission medications    Medication Sig Start Date End Date Taking? Authorizing Provider   amoxicillin (AMOXIL) 500 MG tablet Take 500 mg by mouth 2 times daily as needed (flares (foliculitis))    Unknown, Entered By History   cholecalciferol (VITAMIN D3) 25 mcg (1000 units) capsule Take 1 capsule by mouth daily    Reported, Patient   MULTIVITAMIN ORAL [MULTIVITAMIN ORAL] Take by mouth daily. 3/2/21   Provider, Historical              Review of Systems:   The Review of Systems is negative other than noted in the HPI            Physical Exam:   No data found.     No intake or output data in the 24 hours ending 06/03/24 0829   Constitutional:   Awake, alert, cooperative, no apparent distress, and appears stated age       Eyes:   PERRL, conjunctiva/corneas clear, EOM's intact; no scleral edema or icterus noted        ENT:   Normocephalic, without obvious abnormality, atraumatic, Lips, mucosa, and tongue normal        Lungs:   Normal respiratory effort, no  accessory muscle use     Abdomen:   Soft not distended abdomen, generally nontender with palpation over all 4 quadrants including epigastric area.  Negative [place] point.  No peritoneal signs or guarding on exam.  Very very mild tenderness with palpation over the right upper quadrant at the time of rounding.       Musculoskeletal:   No obvious swelling, bruising or deformity       Skin:   Skin color and texture normal for patient, no rashes or lesions              Data:         All imaging studies reviewed by me.    Results for orders placed or performed during the hospital encounter of 06/02/24 (from the past 24 hour(s))   MR Abdomen MRCP w/o & w Contrast    Narrative    EXAM: MR ABDOMEN MRCP W/O and W CONTRAST  LOCATION: [facility]  DATE: 6/2/2024    INDICATION: Right upper quadrant pain. Dilated common bile duct on ultrasound.  COMPARISON: Ultrasound 6/2/2024.  TECHNIQUE: Routine MR liver/pancreas protocol including axial and coronal MRCP sequences. 2D and 3D reconstruction performed by MR technologist including MIP reconstruction and slab cholangiograms. If performed with contrast, additional dynamic T1 post   IV contrast images.  CONTRAST: Gadavist 7 mL     FINDINGS:     LIVER: Noncirrhotic liver morphology. Mild diffuse hepatic steatosis. No focal liver lesions.    GALLBLADDER/BILIARY: Gallbladder is diffusely edematous, mildly distended, and contains a single gallstone. Mild dilation of the common bile duct up to 0.9 cm with gradual tapering towards the ampulla. There is a 0.2 cm choledocholith within the distal   common bile duct near the ampulla (13/#24). No intrahepatic biliary ductal dilation.    PANCREAS: Multiple tiny cystic lesions within the tail of the pancreas measuring up to 0.7 cm (13/#22). No solid pancreatic mass. No dilation of the main pancreatic duct. Conventional ductal anatomy.    SPLEEN: Normal.    ADRENALS: Normal.    KIDNEYS: Normal.    BOWEL: No bowel  obstruction or inflammation. Normal appendix.    LYMPH NODES: No enlarged lymph node. Patent portal, splenic, and superior mesenteric veins. No abdominal aortic aneurysm.    VASCULATURE: Normal.    LUNG BASES: Normal.    MUSCULOSKELETAL: No acute bony abnormality.      Impression    IMPRESSION:    1.  There is a 0.2 cm choledocholith within the distal common bile duct near the ampulla resulting in mild extrahepatic biliary ductal dilation. No intrahepatic biliary ductal dilation.     2.  Cholelithiasis with mild gallbladder distention and diffuse gallbladder wall edema, most likely related to the choledocholith. Other causes of gallbladder wall edema include IV hydration, reactive changes to hepatitis, and cholecystitis.    3.  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 No high-risk features. See follow-up guidelines below.    4.  Mild diffuse hepatic steatosis.    REFERENCE:  Revisions of international consensus [place] guidelines for the management of IPMN of the pancreas. Pancreatology 2017;17(5):738-753.    Largest cyst less than 10 mm: CT or MRI/MRCP in 6 months and then every 2 years if no change.   CBC with platelets differential    Narrative    The following orders were created for panel order CBC with platelets differential.  Procedure                               Abnormality         Status                     ---------                               -----------         ------                     CBC with platelets and d...[[id number]]                      Final result                 Please view results for these tests on the individual orders.   Basic metabolic panel   Result Value Ref Range    Sodium 140 135 - 145 mmol/L    Potassium 3.7 3.4 - 5.3 mmol/L    Chloride 104 98 - 107 mmol/L    Carbon Dioxide (CO2) 25 22 - 29 mmol/L    Anion Gap 11 7 - 15 mmol/L    Urea Nitrogen 11.3 6.0 - 20.0 mg/dL    Creatinine 0.99 0.67 - 1.17 mg/dL     GFR Estimate >90 >60 mL/min/1.73m2    Calcium 9.3 8.6 - 10.0 mg/dL    Glucose 106 (H) 70 - 99 mg/dL   Hepatic panel   Result Value Ref Range    Protein Total 6.6 6.4 - 8.3 g/dL    Albumin 4.3 3.5 - 5.2 g/dL    Bilirubin Total 1.5 (H) <=1.2 mg/dL    Alkaline Phosphatase 67 40 - 150 U/L    AST 51 (H) 0 - 45 U/L    ALT 54 0 - 70 U/L    Bilirubin Direct 0.30 0.00 - 0.30 mg/dL   Magnesium   Result Value Ref Range    Magnesium 2.1 1.7 - 2.3 mg/dL   CBC with platelets and differential   Result Value Ref Range    WBC Count 8.9 4.0 - 11.0 10e3/uL    RBC Count 4.96 4.40 - 5.90 10e6/uL    Hemoglobin 14.6 13.3 - 17.7 g/dL    Hematocrit 43.5 40.0 - 53.0 %    MCV 88 78 - 100 fL    MCH 29.4 26.5 - 33.0 pg    MCHC 33.6 31.5 - 36.5 g/dL    RDW 14.6 10.0 - 15.0 %    Platelet Count 249 150 - 450 10e3/uL    % Neutrophils 57 %    % Lymphocytes 35 %    % Monocytes 6 %    % Eosinophils 1 %    % Basophils 0 %    % Immature Granulocytes 0 %    NRBCs per 100 WBC 0 <1 /100    Absolute Neutrophils 5.1 1.6 - 8.3 10e3/uL    Absolute Lymphocytes 3.1 0.8 - 5.3 10e3/uL    Absolute Monocytes 0.6 0.0 - 1.3 10e3/uL    Absolute Eosinophils 0.1 0.0 - 0.7 10e3/uL    Absolute Basophils 0.0 0.0 - 0.2 10e3/uL    Absolute Immature Granulocytes 0.0 <=0.4 10e3/uL    Absolute NRBCs 0.0 10e3/uL           Julieta Gaines PA-C  [facility]  [address], [address]  [place] [address]

## 2024-06-03 NOTE — PROGRESS NOTES
Patient arrived at 1500pm via EMT from [facility] s/p cholecystectomy, A&Ox4, denied pain, post surgical  VS started at 1506pm, 2 bags of belongings noted, per patient the rest of the belongings are with his wife Irin. HUC called wife to inform that patient has arrived in [facility]. Patient is settled in bed for the next phase of care. Writer gave report and rounded at the bedside with the receiving RN of the evening shift. Noted wife in the room. -Roseann VALLE RN

## 2024-06-03 NOTE — ANESTHESIA PREPROCEDURE EVALUATION
Anesthesia Pre-Procedure Evaluation    Patient: Trini Salter   MRN: [id number] : 1970        Procedure : Procedure(s):  ENDOSCOPIC RETROGRADE CHOLANGIOPANCREATOGRAPHY  CHOLECYSTECTOMY, LAPAROSCOPIC          Past Medical History:   Diagnosis Date    External hemorrhoids     Folliculitis     Hyperlipidemia       Past Surgical History:   Procedure Laterality Date    ESOPHAGOSCOPY, GASTROSCOPY, DUODENOSCOPY (EGD), COMBINED N/A 2024    Procedure: ENDOSCOPIC ULTRASOUND;  Surgeon: Dmitriy Gan MD;  Location: [facility] Main OR      No Known Allergies   Social History     Tobacco Use    Smoking status: Former     Current packs/day: 0.00     Types: Cigarettes     Quit date: 9/10/2003     Years since quittin.7    Smokeless tobacco: Never    Tobacco comments:     Light   Substance Use Topics    Alcohol use: No     Comment: Alcoholic Drinks/day:        Wt Readings from Last 1 Encounters:   24 68.3 kg (150 lb 9.6 oz)        Anesthesia Evaluation   Pt has had prior anesthetic.     No history of anesthetic complications       ROS/MED HX  ENT/Pulmonary:     (+)                tobacco use, Past use,                    (-) asthma, COPD, sleep apnea, EBEN risk factors and recent URI   Neurologic:    (-) no seizures and no CVA   Cardiovascular:     (+) Dyslipidemia - -   -  - -                                   (-) hypertension, taking anticoagulants/antiplatelets, syncope and arrhythmias   METS/Exercise Tolerance: >4 METS    Hematologic:    (-) anemia   Musculoskeletal:       GI/Hepatic: Comment: MR abdomen:  IMPRESSION:     1.  There is a 0.2 cm choledocholith within the distal common bile duct near the ampulla resulting in mild extrahepatic biliary ductal dilation. No intrahepatic biliary ductal dilation.      2.  Cholelithiasis with mild gallbladder distention and diffuse gallbladder wall edema, most likely related to the choledocholith. Other causes of gallbladder wall edema include IV  "hydration, reactive changes to hepatitis, and cholecystitis.     3.  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 No high-risk features. See follow-up guidelines below.     4.  Mild diffuse hepatic steatosis.      (+)          cholecystitis/cholelithiasis,       (-) GERD   Renal/Genitourinary:    (-) renal disease   Endo:    (-) Type I DM, Type II DM, thyroid disease and obesity   Psychiatric/Substance Use:    (-) chronic opioid use history   Infectious Disease:    (-) Recent Fever   Malignancy:       Other:            Physical Exam    Airway        Mallampati: II   TM distance: > 3 FB   Neck ROM: full   Mouth opening: > 3 cm    Respiratory Devices and Support         Dental     Comment: Fair dentition, no loose or removable teeth        Cardiovascular          Rhythm and rate: regular and normal     Pulmonary           breath sounds clear to auscultation           OUTSIDE LABS:  CBC:   Lab Results   Component Value Date    WBC 8.9 06/03/2024    WBC 12.7 (H) 06/02/2024    HGB 14.6 06/03/2024    HGB 13.9 06/02/2024    HCT 43.5 06/03/2024    HCT 41.1 06/02/2024     06/03/2024     06/02/2024     BMP:   Lab Results   Component Value Date     06/03/2024     06/02/2024    POTASSIUM 3.7 06/03/2024    POTASSIUM 3.8 06/02/2024    CHLORIDE 104 06/03/2024    CHLORIDE 100 06/02/2024    CO2 25 06/03/2024    CO2 26 06/02/2024    BUN 11.3 06/03/2024    BUN 16.3 06/02/2024    CR 0.99 06/03/2024    CR 0.95 06/02/2024     (H) 06/03/2024     (H) 06/02/2024     COAGS: No results found for: \"PTT\", \"INR\", \"FIBR\"  POC: No results found for: \"BGM\", \"HCG\", \"HCGS\"  HEPATIC:   Lab Results   Component Value Date    ALBUMIN 4.3 06/03/2024    PROTTOTAL 6.6 06/03/2024    ALT 54 06/03/2024    AST 51 (H) 06/03/2024    ALKPHOS 67 06/03/2024    BILITOTAL 1.5 (H) 06/03/2024     OTHER:   Lab Results   Component Value Date    A1C 6.0 (H) " 04/03/2024    SAI 9.3 06/03/2024    MAG 2.1 06/03/2024    LIPASE 60 06/02/2024       Anesthesia Plan    ASA Status:  2    NPO Status:  NPO Appropriate    Anesthesia Type: General.     - Airway: ETT      Maintenance: TIVA.        Consents    Anesthesia Plan(s) and associated risks, benefits, and realistic alternatives discussed. Questions answered and patient/representative(s) expressed understanding.     - Discussed: Risks, Benefits and Alternatives for BOTH SEDATION and the PROCEDURE were discussed     - Discussed with:  Patient      - Extended Intubation/Ventilatory Support Discussed: No.      - Patient is DNR/DNI Status: No     Use of blood products discussed: No .     Postoperative Care    Pain management: IV analgesics, Multi-modal analgesia.   PONV prophylaxis: Ondansetron (or other 5HT-3), Dexamethasone or Solumedrol     Comments:    Other Comments:   GETA  TIVA  Ketorolac 15 mg if ok w/ surgeon  Dexamethasone, ondansetron PONV ppx           Ely Tejeda MD    I have reviewed the pertinent notes and labs in the chart from the past 30 days and (re)examined the patient.  Any updates or changes from those notes are reflected in this note.

## 2024-06-03 NOTE — PHARMACY-ADMISSION MEDICATION HISTORY
Pharmacist Admission Medication History    Admission medication history is complete. The information provided in this note is only as accurate as the sources available at the time of the update.    Information Source(s): Patient via in-person    Pertinent Information: Patient states all meds last taken on Saturday.    Changes made to PTA medication list:  Added: None  Deleted: None  Changed: None    Allergies reviewed with patient and updates made in EHR: yes    Medication History Completed By: Chandu Jean [facility] 6/3/2024 9:46 AM    PTA Med List   Medication Sig Last Dose    amoxicillin (AMOXIL) 500 MG tablet Take 500 mg by mouth 2 times daily as needed (flares (foliculitis)) 6/1/2024 at PRN    cholecalciferol (VITAMIN D3) 25 mcg (1000 units) capsule Take 1 capsule by mouth daily 6/1/2024    MULTIVITAMIN ORAL [MULTIVITAMIN ORAL] Take by mouth daily. 6/1/2024

## 2024-06-03 NOTE — PROGRESS NOTES
RN reported need for PCS - this is signed. Plan is to go back to [facility] after [facility] Procedure.    Plans - Confirmed with RN no discharge/readmit orders -- Will put one

## 2024-06-03 NOTE — OR NURSING
REPORT GIVEN TO EMS RAYMOND.. PT VITALS STABLE , WIFE WENT TO  [place] TO MEET PT. REPORT CALLED TO [facility] NURSE AT 1400 PT DISCHEDRGE VIA EMS ROSSANA.

## 2024-06-03 NOTE — ANESTHESIA PROCEDURE NOTES
Airway       Patient location during procedure: OR       Procedure Start/Stop Times: 6/3/2024 10:16 AM  Staff -        CRNA: Ermelinda Mcclain APRN CRNA       Performed By: CRNA  Consent for Airway        Urgency: elective  Indications and Patient Condition       Indications for airway management: kd-procedural       Induction type:intravenous       Mask difficulty assessment: 1 - vent by mask    Final Airway Details       Final airway type: endotracheal airway       Successful airway: ETT - single  Endotracheal Airway Details        ETT size (mm): 7.5       Cuffed: yes       Cuff volume (mL): 5       Successful intubation technique: direct laryngoscopy       DL Blade Type: MAC 3       Grade View of Cords: 1       Adjucts: stylet       Position: Right       Measured from: gums/teeth       Secured at (cm): 22       Bite block used: Soft    Post intubation assessment        Placement verified by: capnometry, equal breath sounds and chest rise        Number of attempts at approach: 1       Number of other approaches attempted: 0       Secured with: tape       Ease of procedure: easy       Dentition: Intact    Medication(s) Administered   Medication Administration Time: 6/3/2024 10:16 AM

## 2024-06-03 NOTE — PLAN OF CARE
Problem: Pain Acute  Goal: Optimal Pain Control and Function  Outcome: Progressing   Goal Outcome Evaluation:                      Denies pain or nausea. Vital signs stable. NPO. IV fluids infusing. Patient to transfer for ERCP this am.

## 2024-06-03 NOTE — ANESTHESIA POSTPROCEDURE EVALUATION
Patient: Trini Salter    Procedure: Procedure(s):  ENDOSCOPIC RETROGRADE CHOLANGIOPANCREATOGRAPHY, BILIARY SPHINCTEROTOMY, STONE REMOVAL  CHOLECYSTECTOMY, LAPAROSCOPIC       Anesthesia Type:  General    Note:  Disposition: Admission   Postop Pain Control: Uneventful            Sign Out: Well controlled pain   PONV: No   Neuro/Psych: Uneventful            Sign Out: Acceptable/Baseline neuro status   Airway/Respiratory: Uneventful            Sign Out: Acceptable/Baseline resp. status   CV/Hemodynamics: Uneventful            Sign Out: Acceptable CV status; No obvious hypovolemia; No obvious fluid overload   Other NRE: NONE   DID A NON-ROUTINE EVENT OCCUR? No           Last vitals:  Vitals Value Taken Time   /94 06/03/24 1334   Temp 36.1  C (96.9  F) 06/03/24 1315   Pulse 75 06/03/24 1344   Resp 17 06/03/24 1344   SpO2 99 % 06/03/24 1344   Vitals shown include unfiled device data.    Electronically Signed By: Ely Tejeda MD  Sol 3, 2024  1:45 PM

## 2024-06-04 VITALS
HEART RATE: 68 BPM | OXYGEN SATURATION: 99 % | DIASTOLIC BLOOD PRESSURE: 84 MMHG | SYSTOLIC BLOOD PRESSURE: 132 MMHG | TEMPERATURE: 98.5 F | RESPIRATION RATE: 16 BRPM

## 2024-06-04 LAB
ALBUMIN SERPL BCG-MCNC: 4.2 G/DL (ref 3.5–5.2)
ALP SERPL-CCNC: 118 U/L (ref 40–150)
ALT SERPL W P-5'-P-CCNC: 318 U/L (ref 0–70)
ANION GAP SERPL CALCULATED.3IONS-SCNC: 10 MMOL/L (ref 7–15)
AST SERPL W P-5'-P-CCNC: 321 U/L (ref 0–45)
BILIRUB SERPL-MCNC: 2.2 MG/DL
BUN SERPL-MCNC: 14.8 MG/DL (ref 6–20)
CALCIUM SERPL-MCNC: 9.2 MG/DL (ref 8.6–10)
CHLORIDE SERPL-SCNC: 102 MMOL/L (ref 98–107)
CREAT SERPL-MCNC: 1 MG/DL (ref 0.67–1.17)
DEPRECATED HCO3 PLAS-SCNC: 28 MMOL/L (ref 22–29)
EGFRCR SERPLBLD CKD-EPI 2021: >90 ML/MIN/1.73M2
ERYTHROCYTE [DISTWIDTH] IN BLOOD BY AUTOMATED COUNT: 14.2 % (ref 10–15)
GLUCOSE BLDC GLUCOMTR-MCNC: 119 MG/DL (ref 70–99)
GLUCOSE SERPL-MCNC: 155 MG/DL (ref 70–99)
HCT VFR BLD AUTO: 41.5 % (ref 40–53)
HGB BLD-MCNC: 14.1 G/DL (ref 13.3–17.7)
MCH RBC QN AUTO: 29.7 PG (ref 26.5–33)
MCHC RBC AUTO-ENTMCNC: 34 G/DL (ref 31.5–36.5)
MCV RBC AUTO: 88 FL (ref 78–100)
PATH REPORT.COMMENTS IMP SPEC: NORMAL
PATH REPORT.COMMENTS IMP SPEC: NORMAL
PATH REPORT.FINAL DX SPEC: NORMAL
PATH REPORT.GROSS SPEC: NORMAL
PATH REPORT.MICROSCOPIC SPEC OTHER STN: NORMAL
PATH REPORT.RELEVANT HX SPEC: NORMAL
PHOTO IMAGE: NORMAL
PLATELET # BLD AUTO: 237 10E3/UL (ref 150–450)
POTASSIUM SERPL-SCNC: 4.3 MMOL/L (ref 3.4–5.3)
PROT SERPL-MCNC: 7 G/DL (ref 6.4–8.3)
RBC # BLD AUTO: 4.74 10E6/UL (ref 4.4–5.9)
SODIUM SERPL-SCNC: 140 MMOL/L (ref 135–145)
WBC # BLD AUTO: 10.4 10E3/UL (ref 4–11)

## 2024-06-04 PROCEDURE — 36415 COLL VENOUS BLD VENIPUNCTURE: CPT | Performed by: PHYSICIAN ASSISTANT

## 2024-06-04 PROCEDURE — 85027 COMPLETE CBC AUTOMATED: CPT | Performed by: PHYSICIAN ASSISTANT

## 2024-06-04 PROCEDURE — 99239 HOSP IP/OBS DSCHRG MGMT >30: CPT | Performed by: HOSPITALIST

## 2024-06-04 PROCEDURE — 82962 GLUCOSE BLOOD TEST: CPT

## 2024-06-04 PROCEDURE — 99024 POSTOP FOLLOW-UP VISIT: CPT

## 2024-06-04 PROCEDURE — 82040 ASSAY OF SERUM ALBUMIN: CPT | Performed by: PHYSICIAN ASSISTANT

## 2024-06-04 ASSESSMENT — ACTIVITIES OF DAILY LIVING (ADL)
ADLS_ACUITY_SCORE: 38
ADLS_ACUITY_SCORE: 31
ADLS_ACUITY_SCORE: 38
ADLS_ACUITY_SCORE: 31
ADLS_ACUITY_SCORE: 38
ADLS_ACUITY_SCORE: 31
ADLS_ACUITY_SCORE: 38

## 2024-06-04 NOTE — PROGRESS NOTES
PRIMARY DIAGNOSIS: BILIARY COLIC/UNCOMPLICATED EARLY ACUTE CHOLECYSTITIS  OUTPATIENT/OBSERVATION GOALS TO BE MET BEFORE DISCHARGE:    1. Pain status: Pain free.  2. Stable vital signs and labs (if performed) at disposition: Yes  3. Tolerating adequate PO diet: Yes  4. Successful cholecystectomy or clear follow up plan with General Surgery team if immediate surgery not performed Yes  5. ADLs back to baseline?  Yes  6. Activity and level of assistance: Ambulating independently.  7. Barriers to discharge noted No

## 2024-06-04 NOTE — PROGRESS NOTES
GI PROGRESS NOTE  6/4/2024  Akkoko Salter  1970  /-57    Subjective:  He is doing well today, with only minor incisional discomfort.  He's eating well and denies having nausea/vomiting.     Objective:    Patient Vitals for the past 24 hrs:   BP Temp Temp src Pulse Resp SpO2   06/04/24 0407 125/70 98.4  F (36.9  C) Oral 77 16 97 %   06/03/24 2345 127/76 98.9  F (37.2  C) Oral 71 16 97 %   06/03/24 2200 134/72 99.5  F (37.5  C) Oral 82 18 98 %   06/03/24 1800 133/83 99  F (37.2  C) Oral 81 20 96 %   06/03/24 1700 (!) 143/83 98.7  F (37.1  C) Oral 78 16 98 %   06/03/24 1600 136/76 97.8  F (36.6  C) Oral 77 18 97 %   06/03/24 1530 (!) 143/85 98.3  F (36.8  C) Oral 75 16 96 %   06/03/24 1506 (!) 146/84 98.4  F (36.9  C) Oral 76 16 96 %   06/03/24 1500 (!) 146/84 98.4  F (36.9  C) Oral 75 16 96 %     There is no height or weight on file to calculate BMI.  Gen: NAD  GI: Non-distended, BS positive, soft, minimally tender near umbilical incision    Laboratory  Recent Labs   Lab Test 06/04/24  0701 06/03/24  0646 06/02/24  0519   WBC 10.4 8.9 12.7*   HGB 14.1 14.6 13.9   MCV 88 88 87    249 257     Recent Labs   Lab Test 06/04/24  0701 06/04/24  0623 06/03/24  1244 06/03/24  0849 06/03/24  0646 06/02/24  0519     --   --   --  140 138   POTASSIUM 4.3  --   --   --  3.7 3.8   CHLORIDE 102  --   --   --  104 100   CO2 28  --   --   --  25 26   BUN 14.8  --   --   --  11.3 16.3   CR 1.00  --   --   --  0.99 0.95   ANIONGAP 10  --   --   --  11 12   SAI 9.2  --   --   --  9.3 9.7   * 119* 131*   < > 106* 170*    < > = values in this interval not displayed.     Recent Labs   Lab Test 06/04/24  0701 06/03/24  0646 06/02/24  0519 04/03/24  1032   ALBUMIN 4.2 4.3 4.8 4.8   BILITOTAL 2.2* 1.5* 0.6 0.6   * 54 63 51   * 51* 74* 42   ALKPHOS 118 67 63 63   LIPASE  --   --  60 80*       ERCP 6/3/2024  Impression:            - The common bile duct was mildly dilated.                           - Choledocholithiasis was found. Complete removal was                          accomplished by biliary sphincterotomy and balloon                          extraction.                          - A biliary sphincterotomy was performed.                          - The biliary tree was swept.                          - Indomethacin given to decrease risk of post-ERCP                          pancreatitis.   Recommendation:        - [facility] care.                          - Avoid aspirin and nonsteroidal anti-inflammatory                          medicines for 2 weeks.                          - Observe patient's clinical course following today's                          ERCP with therapeutic intervention.     MR Abdomen MRCP w/o & w Contrast  Result Date: 6/2/2024  EXAM: MR ABDOMEN MRCP W/O and W CONTRAST LOCATION: [facility] DATE: 6/2/2024 INDICATION: Right upper quadrant pain. Dilated common bile duct on ultrasound. COMPARISON: Ultrasound 6/2/2024. TECHNIQUE: Routine MR liver/pancreas protocol including axial and coronal MRCP sequences. 2D and 3D reconstruction performed by MR technologist including MIP reconstruction and slab cholangiograms. If performed with contrast, additional dynamic T1 post IV contrast images. CONTRAST: Gadavist 7 mL FINDINGS: LIVER: Noncirrhotic liver morphology. Mild diffuse hepatic steatosis. No focal liver lesions. GALLBLADDER/BILIARY: Gallbladder is diffusely edematous, mildly distended, and contains a single gallstone. Mild dilation of the common bile duct up to 0.9 cm with gradual tapering towards the ampulla. There is a 0.2 cm choledocholith within the distal common bile duct near the ampulla (13/#24). No intrahepatic biliary ductal dilation. PANCREAS: Multiple tiny cystic lesions within the tail of the pancreas measuring up to 0.7 cm (13/#22). No solid pancreatic mass. No dilation of the main pancreatic duct. Conventional  ductal anatomy. SPLEEN: Normal. ADRENALS: Normal. KIDNEYS: Normal. BOWEL: No bowel obstruction or inflammation. Normal appendix. LYMPH NODES: No enlarged lymph node. Patent portal, splenic, and superior mesenteric veins. No abdominal aortic aneurysm. VASCULATURE: Normal. LUNG BASES: Normal. MUSCULOSKELETAL: No acute bony abnormality.     IMPRESSION: 1.  There is a 0.2 cm choledocholith within the distal common bile duct near the ampulla resulting in mild extrahepatic biliary ductal dilation. No intrahepatic biliary ductal dilation. 2.  Cholelithiasis with mild gallbladder distention and diffuse gallbladder wall edema, most likely related to the choledocholith. Other causes of gallbladder wall edema include IV hydration, reactive changes to hepatitis, and cholecystitis. 3.  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 No high-risk features. See follow-up guidelines below. 4.  Mild diffuse hepatic steatosis. REFERENCE: Revisions of international consensus [place] guidelines for the management of IPMN of the pancreas. Pancreatology 2017;17(5):738-753. Largest cyst less than 10 mm: CT or MRI/MRCP in 6 months and then every 2 years if no change.    US Abdomen Limited  Result Date: 6/2/2024  EXAM: ULTRASOUND ABDOMEN LIMITED LOCATION: [facility] DATE: 06/02/2024 INDICATION: Right upper quadrant pain. COMPARISON: 03/14/2024. TECHNIQUE: Limited abdominal ultrasound. FINDINGS: GALLBLADDER: Cholelithiasis. No gallbladder wall thickening. Negative sonographic Hudson's sign. No evidence for cholecystitis. Ringdown artifact seen in the gallbladder wall compatible with adenomyomatosis. BILE DUCTS: No intrahepatic biliary dilatation. Common bile duct, however, is dilated up to 1.1 cm. LIVER: Normal parenchyma with smooth contour. No focal mass. RIGHT KIDNEY: No hydronephrosis. PANCREAS: Limited visualization due to overlying bowel gas. No  ascites.     IMPRESSION: 1.  Cholelithiasis without evidence for cholecystitis. 2.  However, there is dilatation of the common bile duct up to 1.1 cm. Cannot exclude distal common bile duct stone or obstructing process. Correlation with biliary enzymes and consider MRCP for further evaluation. 3.  Incidentally noted is adenomyomatosis of the gallbladder.     Assessment:  He is a 53 year old male admitted with abdominal pain due to symptomatic choledocholithiasis, now S/P ERCP with sphincterotomy, stone removal, and lap alan.  LFTs did bump but is likely related to procedure/surgery.  Pain is mild.    Patient Active Problem List   Diagnosis    External hemorrhoids    Hyperlipidemia    Folliculitis    Prediabetes    Abdominal pain, epigastric    Choledocholithiasis        Plan:    1. Diet as tolerated.  2.  Recheck LFTs at some point as an outpatient.  This can be done at his primary clinic.  3.  Ok to discharge home today from a GI standpoint.  4.  Re: pancreas cysts per MRCP report: recommend CT or MRI/MRCP in 6 months and then every 2 years if no change.  This can be ordered by the primary provider.    20 minutes of total time was spent providing patient care, including patient evaluation, reviewing documentation/test results and .                                                Ashley Porras PA-C  Thank you for the opportunity to participate in the care of this patient.   Please feel free to call me with any questions or concerns.  Phone number [phone].

## 2024-06-04 NOTE — PROGRESS NOTES
Cleared for discharge, IV removed. Surgical instructions reviewed, no new medications. Education given.

## 2024-06-04 NOTE — PROGRESS NOTES
General Surgery Progress Note:    Hospital Day # 0    ASSESSMENT:  No diagnosis found.    Trini Salter is a 53 year old male who is s/p laparoscopic cholecystectomy and ERCP on 6/4/24. Patient is afebrile with stable vitals. Patient progressing well and tolerating diet, appropriately tender abdomen postoperatively. Okay for discharge. Elevated LFTs not unexpected postoperatively for this type of combo surgery and expect they will normalize. If patient notices jaundice or increased pain / inability to tolerate diet at home would recommend repeating LFTs and bilirubin with primary care.    PLAN:  -Okay for diet as tolerated  -Activity as tolerated  -Pain management okay with Tylenol up to 975mg each dose; can have oxycodone 2.5-5 mg PRN Q6h if patient would like or can call clinic if he needs Rx outpatient  -Recommend Senna or Dulcolax or miralax for constipation as needed and as taking narcotic medications  -Okay to discharge from surgical standpoint if deemed medically appropriate.  -Discharge recommendations involve instructions placed in AVS.  -Follow up phone call in 1-2 weeks  -Medical management per primary team    SUBJECTIVE:   Trini Salter was seen on rounds. Patient states he is doing well and tolerating regular diet, is passing flatus and had a bowel movement which was normal for him. Denies fever, chills, nausea, vomiting, changes in urination. Is tolerating pain level only minor tenderness and soreness at incisions currently.      Patient Vitals for the past 24 hrs:   BP Temp Temp src Pulse Resp SpO2   06/04/24 0825 132/84 98.5  F (36.9  C) Oral 68 16 99 %   06/04/24 0407 125/70 98.4  F (36.9  C) Oral 77 16 97 %   06/03/24 2345 127/76 98.9  F (37.2  C) Oral 71 16 97 %   06/03/24 2200 134/72 99.5  F (37.5  C) Oral 82 18 98 %   06/03/24 1800 133/83 99  F (37.2  C) Oral 81 20 96 %   06/03/24 1700 (!) 143/83 98.7  F (37.1  C) Oral 78 16 98 %   06/03/24 1600 136/76 97.8  F (36.6  C) Oral 77 18 97 %    06/03/24 1530 (!) 143/85 98.3  F (36.8  C) Oral 75 16 96 %   06/03/24 1506 (!) 146/84 98.4  F (36.9  C) Oral 76 16 96 %   06/03/24 1500 (!) 146/84 98.4  F (36.9  C) Oral 75 16 96 %         PHYSICAL EXAM:  General: patient seen resting in bed in no acute distress, pleasant and talkative  Resp: no increased work of breathing, breathing comfortably on room air  Abdomen: generally soft and appropriately tender with palpation near incision sites otherwise generally nontender and no peritoneal signs or guarding on exam. Incisions with bandaids and steri strips clean, dry, intact. Bandages removed.  Drains: no drains  Extremities: No edema or cyanosis visualized on exam, no obvious deformities    06/03 0700 - 06/04 0659  In: 480 [P.O.:480]  Out: -     Admission on 06/03/2024   Component Date Value    Sodium 06/04/2024 140     Potassium 06/04/2024 4.3     Carbon Dioxide (CO2) 06/04/2024 28     Anion Gap 06/04/2024 10     Urea Nitrogen 06/04/2024 14.8     Creatinine 06/04/2024 1.00     GFR Estimate 06/04/2024 >90     Calcium 06/04/2024 9.2     Chloride 06/04/2024 102     Glucose 06/04/2024 155 (H)     Alkaline Phosphatase 06/04/2024 118     AST 06/04/2024 321 (H)     ALT 06/04/2024 318 (H)     Protein Total 06/04/2024 7.0     Albumin 06/04/2024 4.2     Bilirubin Total 06/04/2024 2.2 (H)     WBC Count 06/04/2024 10.4     RBC Count 06/04/2024 4.74     Hemoglobin 06/04/2024 14.1     Hematocrit 06/04/2024 41.5     MCV 06/04/2024 88     MCH 06/04/2024 29.7     MCHC 06/04/2024 34.0     RDW 06/04/2024 14.2     Platelet Count 06/04/2024 237     GLUCOSE BY METER POCT 06/04/2024 119 (H)    Admission on 06/03/2024, Discharged on 06/03/2024   Component Date Value    GLUCOSE BY METER POCT 06/03/2024 109 (H)     ERCP 06/03/2024                      Value:[facility]  [address] [place] [address]  _______________________________________________________________________________  Patient Name: Trini Salter            Procedure Date: 6/3/2024 11:12 AM  MRN: [id number]                       Account Number: [id number]  YOB: 1970             Admit Type: Inpatient  Age: 53                               Room: [place] [address]  Note Status: Finalized                Attending MD: TONI TELLEZ MD,     Instrument Name: ERCP Scope [id number]        _______________________________________________________________________________     Procedure:             ERCP  Indications:           Bile duct stone on Computed Tomogram Scan  Providers:             TONI TELLEZ MD  Referring MD:            Medicines:             General Anesthesia  Complications:         No immediate complications.  _______________________________________________________________________________  Procedure:             Pre-Anesthes                          ia Assessment:                         - Prior to the procedure, a History and Physical was                          performed, and patient medications and allergies were                          reviewed. The patient is competent. The risks and                          benefits of the procedure and the sedation options and                          risks were discussed with the patient. All questions                          were answered and informed consent was obtained.                          Patient identification and proposed procedure were                          verified by the physician and the nurse in the                          pre-procedure area. Mental Status Examination: alert                          and oriented. Airway Examination: normal oropharyngeal                          airway and neck mobility. Respiratory Examination:                          clear to auscultation. CV Examination: normal. ASA                          Grade Assessment: II - A patient with m                          ild systemic                          disease. After reviewing the risks and  benefits, the                          patient was deemed in satisfactory condition to                          undergo the procedure. The anesthesia plan was to use                          general anesthesia. Immediately prior to                          administration of medications, the patient was                          re-assessed for adequacy to receive sedatives. The                          heart rate, respiratory rate, oxygen saturations,                          blood pressure, adequacy of pulmonary ventilation, and                          response to care were monitored throughout the                          procedure. The physical status of the patient was                          re-assessed after the procedure.                         After obtaining informed consent, the scope was passed                          under direct vision. Throughout the procedure, the                          pat                          ient's blood pressure, pulse, and oxygen                          saturations were monitored continuously. The ERCP                          scope was introduced through the mouth, and used to                          inject contrast into and used to cannulate the bile                          duct. The ERCP was performed with moderate difficulty                          due to challenging cannulation. The patient tolerated                          the procedure well.                                                                                   Findings:       A  film of the abdomen was obtained. Surgical clips, consistent        with a previous cholecystectomy, were seen in the area of the right        upper quadrant of the abdomen. The esophagus was successfully intubated        under direct vision. The scope was advanced to a normal major papilla in        the descending duodenum without detailed examination of the pharynx,        larynx and associated structures, and upper                            GI tract. The upper GI tract        was grossly normal. A biliary pre-cut sphincterotomy measuring 5 mm in        length was made with a needle knife using a freehand technique after        unsuccessful cannulation using the sphincterotome and .035 guidewire.        There was no post-sphincterotomy bleeding. A 0.035 inch straight        standard wire was passed into the biliary tree. The Fusion OMNI        sphincterotome was passed over the guidewire and the bile duct was then        deeply cannulated. Contrast was injected. I personally interpreted the        bile duct images. There was brisk flow of contrast through the ducts.        Image quality was excellent. Contrast extended to the hepatic ducts. The        common bile duct was mildly dilated. The largest diameter was 8 mm. To        discover objects, the biliary tree was swept with an 8.5 mm balloon        starting at the bifurcation. Sludge was swept from the duct. One stone        was removed. No stones remained. Indomethaci                          n 100 mg was given via        suppository to decrease the risk of post-ERCP pancreatitis (PEP).                                                                                   Moderate Sedation:       See anesthesia note  Impression:            - The common bile duct was mildly dilated.                         - Choledocholithiasis was found. Complete removal was                          accomplished by biliary sphincterotomy and balloon                          extraction.                         - A biliary sphincterotomy was performed.                         - The biliary tree was swept.                         - Indomethacin given to decrease risk of post-ERCP                          pancreatitis.  Recommendation:        - Return patient to [facility] for ongoing care.                         - Avoid aspirin and nonsteroidal anti-inflammatory                           medicines for 2 weeks.                         - Observe patient's clinical course following today's                                                    ERCP with therapeutic intervention.                                                                                     Toni Chase MD  __________________________  TONI CHASE MD  6/3/2024 12:32:10 PM  I was physically present for the entire viewing portion of the exam.  __________________________  Signature of teaching physician  Brittaney/Nahomi CHASE MD  Number of Addenda: 0    Note Initiated On: 6/3/2024 11:12 AM  Scope In: 11:22:46 AM  Scope Out: 12:20:51 PM      GLUCOSE BY METER POCT 06/03/2024 131 (H)    Admission on 06/02/2024, Discharged on 06/03/2024   Component Date Value    Sodium 06/02/2024 138     Potassium 06/02/2024 3.8     Carbon Dioxide (CO2) 06/02/2024 26     Anion Gap 06/02/2024 12     Urea Nitrogen 06/02/2024 16.3     Creatinine 06/02/2024 0.95     GFR Estimate 06/02/2024 >90     Calcium 06/02/2024 9.7     Chloride 06/02/2024 100     Glucose 06/02/2024 170 (H)     Alkaline Phosphatase 06/02/2024 63     AST 06/02/2024 74 (H)     ALT 06/02/2024 63     Protein Total 06/02/2024 7.4     Albumin 06/02/2024 4.8     Bilirubin Total 06/02/2024 0.6     Lipase 06/02/2024 60     WBC Count 06/02/2024 12.7 (H)     RBC Count 06/02/2024 4.73     Hemoglobin 06/02/2024 13.9     Hematocrit 06/02/2024 41.1     MCV 06/02/2024 87     MCH 06/02/2024 29.4     MCHC 06/02/2024 33.8     RDW 06/02/2024 14.1     Platelet Count 06/02/2024 257     % Neutrophils 06/02/2024 72     % Lymphocytes 06/02/2024 23     % Monocytes 06/02/2024 4     % Eosinophils 06/02/2024 1     % Basophils 06/02/2024 0     % Immature Granulocytes 06/02/2024 1     NRBCs per 100 WBC 06/02/2024 0     Absolute Neutrophils 06/02/2024 9.2 (H)     Absolute Lymphocytes 06/02/2024 2.9     Absolute Monocytes 06/02/2024 0.5     Absolute Eosinophils 06/02/2024 0.1     Absolute Basophils 06/02/2024 0.0      Absolute Immature Granul* 06/02/2024 0.1     Absolute NRBCs 06/02/2024 0.0     Sodium 06/03/2024 140     Potassium 06/03/2024 3.7     Chloride 06/03/2024 104     Carbon Dioxide (CO2) 06/03/2024 25     Anion Gap 06/03/2024 11     Urea Nitrogen 06/03/2024 11.3     Creatinine 06/03/2024 0.99     GFR Estimate 06/03/2024 >90     Calcium 06/03/2024 9.3     Glucose 06/03/2024 106 (H)     Protein Total 06/03/2024 6.6     Albumin 06/03/2024 4.3     Bilirubin Total 06/03/2024 1.5 (H)     Alkaline Phosphatase 06/03/2024 67     AST 06/03/2024 51 (H)     ALT 06/03/2024 54     Bilirubin Direct 06/03/2024 0.30     Magnesium 06/03/2024 2.1     WBC Count 06/03/2024 8.9     RBC Count 06/03/2024 4.96     Hemoglobin 06/03/2024 14.6     Hematocrit 06/03/2024 43.5     MCV 06/03/2024 88     MCH 06/03/2024 29.4     MCHC 06/03/2024 33.6     RDW 06/03/2024 14.6     Platelet Count 06/03/2024 249     % Neutrophils 06/03/2024 57     % Lymphocytes 06/03/2024 35     % Monocytes 06/03/2024 6     % Eosinophils 06/03/2024 1     % Basophils 06/03/2024 0     % Immature Granulocytes 06/03/2024 0     NRBCs per 100 WBC 06/03/2024 0     Absolute Neutrophils 06/03/2024 5.1     Absolute Lymphocytes 06/03/2024 3.1     Absolute Monocytes 06/03/2024 0.6     Absolute Eosinophils 06/03/2024 0.1     Absolute Basophils 06/03/2024 0.0     Absolute Immature Granul* 06/03/2024 0.0     Absolute NRBCs 06/03/2024 0.0         [facility]  [address],  [place] [address]  [facility] [phone]

## 2024-06-04 NOTE — PROGRESS NOTES
PRIMARY DIAGNOSIS: BILIARY COLIC/UNCOMPLICATED EARLY ACUTE CHOLECYSTITIS  OUTPATIENT/OBSERVATION GOALS TO BE MET BEFORE DISCHARGE:    1. Pain status: Pain free.  2. Stable vital signs and labs (if performed) at disposition: Yes  3. Tolerating adequate PO diet: Yes  4. Successful cholecystectomy or clear follow up plan with General Surgery team if immediate surgery not performed Yes  5. ADLs back to baseline?  Yes  6. Activity and level of assistance: Ambulating independently.  7. Barriers to discharge noted No    Pt oriented x4. Denying pain. Tolerating PO, diet advanced to regular. Lap sites CDI.

## 2024-06-04 NOTE — PHARMACY-ADMISSION MEDICATION HISTORY
Copy of admission med history completed 6/2/24    Pharmacy Intern Admission Medication History    Admission medication history is complete. The information provided in this note is only as accurate as the sources available at the time of the update.    Information Source(s): Patient and CareEverywhere/SureScripts via in-person    Pertinent Information: Amoxicillin: takes amoxicillin 1-2 tablets by mouth daily as needed for flares of folliculitis; He has been using for a couple of weeks recently. Last dose was yesterday morning    Changes made to PTA medication list:  Added: None  Deleted: None  Changed: added prn on amoxicillin    Allergies reviewed with patient and updates made in EHR: yes    Medication History Completed By: Clarisse Bragg 6/2/2024 10:55 AM    No outpatient medications have been marked as taking for the 6/3/24 encounter (Hospital Encounter).

## 2024-06-04 NOTE — DISCHARGE INSTRUCTIONS
From your Surgeon:    Follow up:  For straightforward laparoscopic procedures, our practice is to check-in with you over the phone a few days after your procedure.  If you would like a scheduled follow up appointment in clinic, please call us at [phone] to schedule an appointment at your convenience.  If you would prefer to follow up with us further by phone, please let us know so that we may contact you 2-3 weeks following your procedure.        Diet: Regular diet. Patients can have difficulty with constipation following surgery, due in part to the administration of narcotic pain medications.  If you are suffering with constipation, you should avoid foods such as hard cheeses or red meat.  Foods high in fiber are recommended.      Activity: You should continue to be active at home, including getting up and walking frequently.  If possible, limit the amount of time spent in bed.    Restrictions: You should not lift greater than 20 pounds for 2 weeks, and will want to avoid strenuous physical activity for 1-2 weeks.  You should limit your physical activity if it causes you discomfort; however, this should resolve within 1-2 weeks.   Walking does not count as strenuous physical activity and you are safe to walk up and down stairs.  Following 2 weeks you may resume normal physical activity.    Work:  You can return to work once your surgical pain has resolved.  If you perform duties that require lifting, pushing or pulling anything greater than 15 pounds, you should be on light duty for the immediate 2 weeks after your surgery (if your work allows light duty).  After 2 weeks, you can return to work without restrictions.    Wound care: You may remove your Band-aids after 24 hours. The small white strips on the incisions act like artificial scabs, and will begin to peel at the edges at around 5-7 days. These can then be removed.  It is normal to have a small rim of red present around the incisions. This should not,  however, extend beyond 1/4 inch from the incision.  If your incisions become increasingly tender, red, or draining, please contact us.       Bathing: You may shower after 24 hours from surgery.  It is ok to get your incisions wet, but avoid rubbing them.  Avoid soaking in bath tubs or swimming in lakes, pools, or streams for 2 weeks following surgery.

## 2024-06-06 ENCOUNTER — PATIENT OUTREACH (OUTPATIENT)
Dept: CARE COORDINATION | Facility: CLINIC | Age: 54
End: 2024-06-06
Payer: COMMERCIAL

## 2024-06-06 NOTE — PROGRESS NOTES
Connected Care Resource Center:   [facility] Resource [facility] Contact  [facility]/Voicemail     Clinical Data: Post-Discharge Outreach     Outreach attempted x 2.  Left message on patient's voicemail, providing [facility]'s central phone number of [phone] ([phone]) for questions/concerns and/or to schedule an appt with an [facility] provider, if they do not have a PCP.      Plan:  [facility] will do no further outreaches at this time.       Kelli Squires RN  [facility] Care Resource [place], [facility]    *Connected Care Resource Team does NOT follow patient ongoing. Referrals are identified based on internal discharge reports and the outreach is to ensure patient has an understanding of their discharge instructions.

## 2024-06-10 ENCOUNTER — OFFICE VISIT (OUTPATIENT)
Dept: FAMILY MEDICINE | Facility: CLINIC | Age: 54
End: 2024-06-10
Payer: COMMERCIAL

## 2024-06-10 VITALS
DIASTOLIC BLOOD PRESSURE: 78 MMHG | HEIGHT: 65 IN | OXYGEN SATURATION: 99 % | RESPIRATION RATE: 18 BRPM | BODY MASS INDEX: 25.11 KG/M2 | SYSTOLIC BLOOD PRESSURE: 124 MMHG | HEART RATE: 52 BPM | TEMPERATURE: 98 F | WEIGHT: 150.7 LBS

## 2024-06-10 DIAGNOSIS — Z09 HOSPITAL DISCHARGE FOLLOW-UP: Primary | ICD-10-CM

## 2024-06-10 DIAGNOSIS — K80.50 CHOLEDOCHOLITHIASIS: ICD-10-CM

## 2024-06-10 DIAGNOSIS — R79.89 ELEVATED LFTS: ICD-10-CM

## 2024-06-10 PROBLEM — R10.13 ABDOMINAL PAIN, EPIGASTRIC: Status: RESOLVED | Noted: 2024-06-02 | Resolved: 2024-06-10

## 2024-06-10 LAB
ALBUMIN SERPL BCG-MCNC: 4.7 G/DL (ref 3.5–5.2)
ALP SERPL-CCNC: 85 U/L (ref 40–150)
ALT SERPL W P-5'-P-CCNC: 93 U/L (ref 0–70)
ANION GAP SERPL CALCULATED.3IONS-SCNC: 9 MMOL/L (ref 7–15)
AST SERPL W P-5'-P-CCNC: 42 U/L (ref 0–45)
BILIRUB SERPL-MCNC: 0.6 MG/DL
BUN SERPL-MCNC: 15 MG/DL (ref 6–20)
CALCIUM SERPL-MCNC: 9.9 MG/DL (ref 8.6–10)
CHLORIDE SERPL-SCNC: 103 MMOL/L (ref 98–107)
CREAT SERPL-MCNC: 0.9 MG/DL (ref 0.67–1.17)
DEPRECATED HCO3 PLAS-SCNC: 28 MMOL/L (ref 22–29)
EGFRCR SERPLBLD CKD-EPI 2021: >90 ML/MIN/1.73M2
GLUCOSE SERPL-MCNC: 107 MG/DL (ref 70–99)
HOLD SPECIMEN: NORMAL
POTASSIUM SERPL-SCNC: 4.2 MMOL/L (ref 3.4–5.3)
PROT SERPL-MCNC: 7.5 G/DL (ref 6.4–8.3)
SODIUM SERPL-SCNC: 140 MMOL/L (ref 135–145)

## 2024-06-10 PROCEDURE — 99213 OFFICE O/P EST LOW 20 MIN: CPT | Mod: 24 | Performed by: FAMILY MEDICINE

## 2024-06-10 PROCEDURE — 80053 COMPREHEN METABOLIC PANEL: CPT | Performed by: FAMILY MEDICINE

## 2024-06-10 PROCEDURE — 36415 COLL VENOUS BLD VENIPUNCTURE: CPT | Performed by: FAMILY MEDICINE

## 2024-06-10 PROCEDURE — G2211 COMPLEX E/M VISIT ADD ON: HCPCS | Performed by: FAMILY MEDICINE

## 2024-06-10 ASSESSMENT — PAIN SCALES - GENERAL: PAINLEVEL: NO PAIN (0)

## 2024-06-10 NOTE — PROGRESS NOTES
"  Assessment & Plan     Hospital discharge follow-up  Patient is clinically doing very well after ERCP and cholecystectomy.    Choledocholithiasis  Status post cholecystectomy.   Reviewed monitoring diet for fatty food and the possible result of overeating fatty food.  - Comprehensive metabolic panel; Future    Elevated LFTs  Most likely related to acute injury from common bile duct stone and procedure.  We will repeat LFTs to monitor for improvement.  If patient has not had full improvement or near full improvement we may need to repeat CMP in 1 to 2 weeks.  - Comprehensive metabolic panel; Future        MED REC REQUIRED  Post Medication Reconciliation Status:     BMI  Estimated body mass index is 25.08 kg/m  as calculated from the following:    Height as of this encounter: 1.651 m (5' 5\").    Weight as of this encounter: 68.4 kg (150 lb 11.2 oz).   Weight management plan: Patient was referred to their PCP to discuss a diet and exercise plan.          Subjective   Trini Card is a 53 year old, presenting for the following health issues:  Hospital F/U ([facility] ED 06/02/2024 for abdominal pain, surgery 06/03/2024, [facility], ENDOSCOPIC RETROGRADE CHOLANGIOPANCREATOGRAPHY, BILIARY SPHINCTEROTOMY, STONE REMOVAL (Mouth), CHOLECYSTECTOMY, LAPAROSCOPIC (Abdomen) /)          6/10/2024     8:55 AM   Additional Questions   Roomed by Flores Turner, Visit Facilitator       HPI     Hospital Follow-up Visit:  Hospital/Nursing Home/IP Rehab Facility: [facility], [facility]  Date of Admission: 06/02/2024  Date of Discharge: 06/03/2024  Reason(s) for Admission: abdominal pain, choledocholithiasis  Was the patient in the ICU or did the patient experience delirium during hospitalization?  No  Do you have any other stressors you would like to discuss with your provider? No    Problems taking medications regularly:  None  Medication changes since discharge: None  Problems adhering to " "non-medication therapy:  None    Summary of hospitalization:  [facility] discharge summary reviewed  Diagnostic Tests/Treatments reviewed.  Follow up needed: Repeat LFTs  Other Healthcare Providers Involved in Patient s Care:   Surgeon and GI  Update since discharge: improved.   Plan of care communicated with patient       Patient was admitted on 6/2/2024 and discharged on 6/3/2024.  Reason for admission was choledocholithiasis requiring ERCP for common bile duct obstruction and lap cholecystectomy.  0.2 cm choledocholith within the distal common bile duct near the ampulla resulting in mild extrahepatic biliary ductal dilation  Patient's last LFTs were 6 days ago and AST was 321 and ALT was 318, which was an increase from the day before when AST was 51 and ALT was 54.  He is tolerating regular diet without diarrhea. He is avoiding fatty foods.  No significant pain.  He is not doing any heavy lifting.              Review of Systems  No fever, no constipation, no diarrhea, no significant pain      Objective    /78 (BP Location: Left arm, Patient Position: Sitting, Cuff Size: Adult Regular)   Pulse 52   Temp 98  F (36.7  C) (Oral)   Resp 18   Ht 1.651 m (5' 5\")   Wt 68.4 kg (150 lb 11.2 oz)   SpO2 99%   BMI 25.08 kg/m    Body mass index is 25.08 kg/m .  Physical Exam   GENERAL: alert and no distress  ABDOMEN: soft, nontender, without hepatosplenomegaly or masses and 4 laparoscopic incisions across abdomen are intact with Steri-Strips and without drainage or discharge or surrounding erythema.  NEURO: Normal strength and tone, mentation intact and speech normal  PSYCH: mentation appears normal, affect normal/bright            Signed Electronically by: Juan Miguel Dorman MD    "

## 2024-06-26 ENCOUNTER — OFFICE VISIT (OUTPATIENT)
Dept: FAMILY MEDICINE | Facility: CLINIC | Age: 54
End: 2024-06-26
Attending: FAMILY MEDICINE
Payer: COMMERCIAL

## 2024-06-26 VITALS
SYSTOLIC BLOOD PRESSURE: 100 MMHG | TEMPERATURE: 97.1 F | OXYGEN SATURATION: 95 % | WEIGHT: 149 LBS | DIASTOLIC BLOOD PRESSURE: 60 MMHG | BODY MASS INDEX: 24.83 KG/M2 | HEIGHT: 65 IN | HEART RATE: 55 BPM

## 2024-06-26 DIAGNOSIS — R73.03 PREDIABETES: ICD-10-CM

## 2024-06-26 DIAGNOSIS — K64.4 EXTERNAL HEMORRHOIDS: ICD-10-CM

## 2024-06-26 DIAGNOSIS — E78.2 MIXED HYPERLIPIDEMIA: Primary | ICD-10-CM

## 2024-06-26 DIAGNOSIS — L73.9 FOLLICULITIS: ICD-10-CM

## 2024-06-26 DIAGNOSIS — Z00.00 PHYSICAL EXAM: ICD-10-CM

## 2024-06-26 PROBLEM — K80.50 CHOLEDOCHOLITHIASIS: Status: RESOLVED | Noted: 2024-06-02 | Resolved: 2024-06-26

## 2024-06-26 LAB
ALBUMIN SERPL BCG-MCNC: 4.8 G/DL (ref 3.5–5.2)
ALP SERPL-CCNC: 74 U/L (ref 40–150)
ALT SERPL W P-5'-P-CCNC: 60 U/L (ref 0–70)
ANION GAP SERPL CALCULATED.3IONS-SCNC: 11 MMOL/L (ref 7–15)
AST SERPL W P-5'-P-CCNC: 51 U/L (ref 0–45)
BILIRUB SERPL-MCNC: 0.8 MG/DL
BUN SERPL-MCNC: 16.4 MG/DL (ref 6–20)
CALCIUM SERPL-MCNC: 9.9 MG/DL (ref 8.6–10)
CHLORIDE SERPL-SCNC: 102 MMOL/L (ref 98–107)
CHOLEST SERPL-MCNC: 266 MG/DL
CREAT SERPL-MCNC: 0.91 MG/DL (ref 0.67–1.17)
DEPRECATED HCO3 PLAS-SCNC: 27 MMOL/L (ref 22–29)
EGFRCR SERPLBLD CKD-EPI 2021: >90 ML/MIN/1.73M2
FASTING STATUS PATIENT QL REPORTED: YES
FASTING STATUS PATIENT QL REPORTED: YES
GLUCOSE SERPL-MCNC: 101 MG/DL (ref 70–99)
HBA1C MFR BLD: 5.7 % (ref 0–5.6)
HDLC SERPL-MCNC: 45 MG/DL
LDLC SERPL CALC-MCNC: 190 MG/DL
NONHDLC SERPL-MCNC: 221 MG/DL
POTASSIUM SERPL-SCNC: 4.3 MMOL/L (ref 3.4–5.3)
PROT SERPL-MCNC: 7.6 G/DL (ref 6.4–8.3)
SODIUM SERPL-SCNC: 140 MMOL/L (ref 135–145)
TRIGL SERPL-MCNC: 157 MG/DL

## 2024-06-26 PROCEDURE — 36415 COLL VENOUS BLD VENIPUNCTURE: CPT | Performed by: FAMILY MEDICINE

## 2024-06-26 PROCEDURE — 99213 OFFICE O/P EST LOW 20 MIN: CPT | Mod: 25 | Performed by: FAMILY MEDICINE

## 2024-06-26 PROCEDURE — 99396 PREV VISIT EST AGE 40-64: CPT | Mod: 24 | Performed by: FAMILY MEDICINE

## 2024-06-26 PROCEDURE — 83036 HEMOGLOBIN GLYCOSYLATED A1C: CPT | Performed by: FAMILY MEDICINE

## 2024-06-26 PROCEDURE — 80061 LIPID PANEL: CPT | Performed by: FAMILY MEDICINE

## 2024-06-26 PROCEDURE — 80053 COMPREHEN METABOLIC PANEL: CPT | Performed by: FAMILY MEDICINE

## 2024-06-26 SDOH — HEALTH STABILITY: PHYSICAL HEALTH: ON AVERAGE, HOW MANY DAYS PER WEEK DO YOU ENGAGE IN MODERATE TO STRENUOUS EXERCISE (LIKE A BRISK WALK)?: 3 DAYS

## 2024-06-26 SDOH — HEALTH STABILITY: PHYSICAL HEALTH: ON AVERAGE, HOW MANY MINUTES DO YOU ENGAGE IN EXERCISE AT THIS LEVEL?: 20 MIN

## 2024-06-26 ASSESSMENT — SOCIAL DETERMINANTS OF HEALTH (SDOH): HOW OFTEN DO YOU GET TOGETHER WITH FRIENDS OR RELATIVES?: ONCE A WEEK

## 2024-06-26 NOTE — PROGRESS NOTES
Preventive Care Visit  [facility] MARYSOL Powell MD, Family Medicine  Jun 26, 2024      SUBJECTIVE:   Kyaw is a 53 year old, presenting for the following:  Physical        6/26/2024     9:14 AM   Additional Questions   Roomed by Priya BINGHAM CMA     HPI  Patient comes in for his annual physical examination.  Earlier in the month the patient underwent both cholecystectomy as well as removal of a common bile duct stone he is doing well.  The patient actually has a history of constipation he takes MiraLAX daily which helps he has not noticed any significant change in his bowels.    Patient has external hemorrhoids, which is another reason for him to be on the MiraLAX of note he is considering to meet with a surgeon at some point this year for potential surgical correction of his hemorrhoids.    Patient has a history of folliculitis he is on amoxicillin as needed for this and followed by dermatology.    Patient has a history of hyperlipidemia attempting to control this with lifestyle of note patient's brother is also hyperlipidemic and is on medication for this.    Patient has a history of prediabetes, there is not a family history of diabetes in his family.    Overall the patient has extremely good health habits he is up-to-date with all of his immunizations of note he does not need the pneumonia vaccine he quit smoking 20 years ago or so and was only a light smoker.    I did discuss with the patient that the likelihood of him getting another common bile duct stone is quite low I would however recommend minimizing as much as possible greasy fatty foods, but this also has the additional benefit of helping to control his blood sugar as well as cholesterol.          Today's PHQ-2 Score:       6/26/2024     9:01 AM   PHQ-2 ( 1999 [employer])   Q1: Little interest or pleasure in doing things 0   Q2: Feeling down, depressed or hopeless 0   PHQ-2 Score 0   Q1: Little interest or pleasure in doing things  Not at all   Q2: Feeling down, depressed or hopeless Not at all   PHQ-2 Score 0                       Social History     Tobacco Use    Smoking status: Former     Current packs/day: 0.00     Types: Cigarettes     Quit date: 9/10/2003     Years since quittin.8     Passive exposure: Never    Smokeless tobacco: Never    Tobacco comments:     Light   Substance Use Topics    Alcohol use: No     Comment: Alcoholic Drinks/day:               2024     9:01 AM   Alcohol Use   Prescreen: >3 drinks/day or >7 drinks/week? Not Applicable       Last PSA:   Prostate Specific Antigen Screen   Date Value Ref Range Status   2021 1.5 0.0 - 2.5 ng/mL Final       Reviewed orders with patient. Reviewed health maintenance and updated orders accordingly - Yes  Lab work is in process  Labs reviewed in [facility]  Current Outpatient Medications   Medication Sig Dispense Refill    amoxicillin (AMOXIL) 500 MG tablet Take 500 mg by mouth 2 times daily as needed (flares (foliculitis))      cholecalciferol (VITAMIN D3) 25 mcg (1000 units) capsule Take 1 capsule by mouth daily      MULTIVITAMIN ORAL [MULTIVITAMIN ORAL] Take by mouth daily.       No Known Allergies    Reviewed and updated as needed this visit by clinical staff   Tobacco  Allergies               Reviewed and updated as needed this visit by Provider                  History reviewed. No pertinent past medical history.   Past Surgical History:   Procedure Laterality Date    ENDOSCOPIC RETROGRADE CHOLANGIOPANCREATOGRAM N/A 6/3/2024    Procedure: ENDOSCOPIC RETROGRADE CHOLANGIOPANCREATOGRAPHY, BILIARY SPHINCTEROTOMY, STONE REMOVAL;  Surgeon: Castro Chase MD;  [place] [facility] [place]    ESOPHAGOSCOPY, GASTROSCOPY, DUODENOSCOPY (EGD), COMBINED N/A 2024    Procedure: ENDOSCOPIC ULTRASOUND;  Surgeon: Dmitriy Gan MD;  [place] [facility] [place]    LAPAROSCOPIC CHOLECYSTECTOMY N/A 6/3/2024    Procedure: CHOLECYSTECTOMY, LAPAROSCOPIC;  Surgeon:  "Destiny Hummel MD;  Location[facility]     Review of Systems    Review of Systems  Constitutional, HEENT, cardiovascular, pulmonary, GI, , musculoskeletal, neuro, skin, endocrine and psych systems are negative, except as otherwise noted.    OBJECTIVE:   /60   Pulse 55   Temp 97.1  F (36.2  C)   Ht 1.651 m (5' 5\")   Wt 67.6 kg (149 lb)   SpO2 95%   BMI 24.79 kg/m     Estimated body mass index is 24.79 kg/m  as calculated from the following:    Height as of this encounter: 1.651 m (5' 5\").    Weight as of this encounter: 67.6 kg (149 lb).  Physical Exam  GENERAL: alert and no distress  EYES: Eyes grossly normal to inspection, PERRL and conjunctivae and sclerae normal  HENT: ear canals and TM's normal, nose and mouth without ulcers or lesions  NECK: no adenopathy, no asymmetry, masses, or scars  RESP: lungs clear to auscultation - no rales, rhonchi or wheezes  CV: regular rate and rhythm, normal S1 S2, no S3 or S4, no murmur, click or rub, no peripheral edema  ABDOMEN: soft, nontender, no hepatosplenomegaly, no masses and bowel sounds normal  MS: no gross musculoskeletal defects noted, no edema  SKIN: no suspicious lesions or rashes  NEURO: Normal strength and tone, mentation intact and speech normal  PSYCH: mentation appears normal, affect normal/bright    Diagnostic Test Results:  Labs reviewed in Epic    ASSESSMENT/PLAN:   (E78.2) Hyperlipidemia  (primary encounter diagnosis)  Comment: Elevated, attempting to control with diet and exercise  Plan: Comprehensive metabolic panel, Lipid panel         reflex to direct LDL Fasting             (Z00.00) Physical exam  Comment: Overall the patient does have good health habits and is in good health  Plan:      (R73.03) Prediabetes  Comment: A1c now at 5.7  Plan: Hemoglobin A1c             (K64.4) External hemorrhoids  Comment: Symptomatic on MiraLAX  Plan:      (L73.9) Folliculitis  Comment: Amoxicillin as needed followed by dermatology  Plan:  "     PLAN:  1.  Laboratory studies as above  2.  The goal is to focus is much as possible on good diet and exercise.  3.  If the cholesterol is significantly or dramatically elevated, I would consider first a coronary calcium score  4.  Patient is also followed regularly by dermatology.  5.  Patient should be seen again in 1 year.            Counseling  Reviewed preventive health counseling, as reflected in patient instructions       Regular exercise       Healthy diet/nutrition        He reports that he quit smoking about 20 years ago. His smoking use included cigarettes. He has never been exposed to tobacco smoke. He has never used smokeless tobacco.            Signed Electronically by: Loy Powell MD

## 2024-06-26 NOTE — LETTER
June 27, 2024      Kyaw Salter  [address] [address] [facility] [address]        Dear ,    We are writing to inform you of your test results.  Sugar is just slightly high at 101 ideally under 100, another blood test called A1c is slightly elevated at 5.7, this is consistent with prediabetes but these numbers actually have improved.  Liver and kidney tests are essentially normal slight elevation of AST is not concerning or significant.  Cholesterol is elevated the LDL is high at 190, I want you to do what you can in terms of diet and exercise to help lower, if the cholesterol stays elevated despite everything you are doing you may need to be on cholesterol medication but not starting anything yet.    See us again in 1 year.          Resulted Orders   Comprehensive metabolic panel   Result Value Ref Range    Sodium 140 135 - 145 mmol/L    Potassium 4.3 3.4 - 5.3 mmol/L    Carbon Dioxide (CO2) 27 22 - 29 mmol/L    Anion Gap 11 7 - 15 mmol/L    Urea Nitrogen 16.4 6.0 - 20.0 mg/dL    Creatinine 0.91 0.67 - 1.17 mg/dL    GFR Estimate >90 >60 mL/min/1.73m2      Comment:      eGFR calculated using 2021 CKD-EPI equation.    Calcium 9.9 8.6 - 10.0 mg/dL    Chloride 102 98 - 107 mmol/L    Glucose 101 (H) 70 - 99 mg/dL    Alkaline Phosphatase 74 40 - 150 U/L    AST 51 (H) 0 - 45 U/L      Comment:      Reference intervals for this test were updated on 6/12/2023 to more accurately reflect our healthy population. There may be differences in the flagging of prior results with similar values performed with this method. Interpretation of those prior results can be made in the context of the updated reference intervals.    ALT 60 0 - 70 U/L      Comment:      Reference intervals for this test were updated on 6/12/2023 to more accurately reflect our healthy population. There may be differences in the flagging of prior results with similar values performed with this method. Interpretation of those prior results can be made in the context  of the updated reference intervals.      Protein Total 7.6 6.4 - 8.3 g/dL    Albumin 4.8 3.5 - 5.2 g/dL    Bilirubin Total 0.8 <=1.2 mg/dL    Patient Fasting > 8hrs? Yes    Lipid panel reflex to direct LDL Fasting   Result Value Ref Range    Cholesterol 266 (H) <200 mg/dL    Triglycerides 157 (H) <150 mg/dL    Direct Measure HDL 45 >=40 mg/dL    LDL Cholesterol Calculated 190 (H) <=100 mg/dL    Non HDL Cholesterol 221 (H) <130 mg/dL    Patient Fasting > 8hrs? Yes     Narrative    Cholesterol  Desirable:  <200 mg/dL    Triglycerides  Normal:  Less than 150 mg/dL  Borderline High:  150-199 mg/dL  High:  200-499 mg/dL  Very High:  Greater than or equal to 500 mg/dL    Direct Measure HDL  Female:  Greater than or equal to 50 mg/dL   Male:  Greater than or equal to 40 mg/dL    LDL Cholesterol  Desirable:  <100mg/dL  Above Desirable:  100-129 mg/dL   Borderline High:  130-159 mg/dL   High:  160-189 mg/dL   Very High:  >= 190 mg/dL    Non HDL Cholesterol  Desirable:  130 mg/dL  Above Desirable:  130-159 mg/dL  Borderline High:  160-189 mg/dL  High:  190-219 mg/dL  Very High:  Greater than or equal to 220 mg/dL   Hemoglobin A1c   Result Value Ref Range    Hemoglobin A1C 5.7 (H) 0.0 - 5.6 %      Comment:      Normal <5.7%   Prediabetes 5.7-6.4%    Diabetes 6.5% or higher     Note: Adopted from ADA consensus guidelines.       If you have any questions or concerns, please call the clinic at the number listed above.       Sincerely,      Loy Powell MD

## 2024-07-01 NOTE — DISCHARGE SUMMARY
[facility]  Hospitalist Discharge Summary      Date of Admission:  6/2/2024  Date of Discharge:  6/3/2024  8:08 AM  Discharging Provider: Nathaniel Montes De Oca MD  Discharge Service: Hospitalist Service    Discharge Diagnoses   Choledocholithiasis    Clinically Significant Risk Factors          Follow-ups Needed After Discharge   Transferred for ERCP    Unresulted Labs Ordered in the Past 30 Days of this Admission       No orders found from 5/3/2024 to 6/3/2024.        These results will be followed up by NA    Discharge Disposition   Transferred to [place] for ERCP  Condition at discharge: Stable    Hospital Course   Trini Salter is a 53 year old male admitted on 6/2/2024. He presents with acute abdominal pain and found to have choledocholithiasis. Per SOC, no beds at [place], admit to [facility], keep at [facility] and on 6/3/24 patient will go to [place] for ERCP and then back to [facility].     Addendum: patient was never transferred back to [facility] and the entirety of his care was completed at [place] per chart review. Events at [facility] / outside hospital per EMR.     Consultations This Hospital Stay   GASTROENTEROLOGY IP CONSULT  GASTROENTEROLOGY IP CONSULT  SURGERY GENERAL IP CONSULT  GASTROENTEROLOGY IP CONSULT  SURGERY GENERAL IP CONSULT  GASTROENTEROLOGY IP CONSULT    Code Status   Prior    Time Spent on this Encounter   I, Nathaniel Montes De Oca MD, personally saw the patient today and spent greater than 30 minutes discharging this patient.       Nathaniel Montes De Oca MD  [address] [address]  Phone: [phone]  Fax: [phone]  ______________________________________________________________________    Physical Exam   Vital Signs:                    Weight: 0 lbs 0 oz         Primary Care Physician   Loy Powell    Discharge Orders   No discharge procedures on file.    Significant Results and Procedures    Most Recent 3 CBC's:  Recent Labs   Lab Test 06/04/24  0701 06/03/24  0646 06/02/24  0519   WBC 10.4 8.9 12.7*   HGB 14.1 14.6 13.9   MCV 88 88 87    249 257     Most Recent 3 BMP's:  Recent Labs   Lab Test 06/26/24  0951 06/10/24  0930 06/04/24  0701    140 140   POTASSIUM 4.3 4.2 4.3   CHLORIDE 102 103 102   CO2 27 28 28   BUN 16.4 15.0 14.8   CR 0.91 0.90 1.00   ANIONGAP 11 9 10   SAI 9.9 9.9 9.2   * 107* 155*     Most Recent 2 LFT's:  Recent Labs   Lab Test 06/26/24  0951 06/10/24  0930   AST 51* 42   ALT 60 93*   ALKPHOS 74 85   BILITOTAL 0.8 0.6   ,   Results for orders placed or performed during the hospital encounter of 06/02/24   US Abdomen Limited    Narrative    EXAM: ULTRASOUND ABDOMEN LIMITED  LOCATION: [facility]  DATE: 06/02/2024    INDICATION: Right upper quadrant pain.  COMPARISON: 03/14/2024.  TECHNIQUE: Limited abdominal ultrasound.    FINDINGS:    GALLBLADDER: Cholelithiasis. No gallbladder wall thickening. Negative sonographic Hudson's sign. No evidence for cholecystitis. Ringdown artifact seen in the gallbladder wall compatible with adenomyomatosis.    BILE DUCTS: No intrahepatic biliary dilatation. Common bile duct, however, is dilated up to 1.1 cm.    LIVER: Normal parenchyma with smooth contour. No focal mass.    RIGHT KIDNEY: No hydronephrosis.    PANCREAS: Limited visualization due to overlying bowel gas.    No ascites.      Impression    IMPRESSION:  1.  Cholelithiasis without evidence for cholecystitis.    2.  However, there is dilatation of the common bile duct up to 1.1 cm. Cannot exclude distal common bile duct stone or obstructing process. Correlation with biliary enzymes and consider MRCP for further evaluation.    3.  Incidentally noted is adenomyomatosis of the gallbladder.     MR Abdomen MRCP w/o & w Contrast    Narrative    EXAM: MR ABDOMEN MRCP W/O and W CONTRAST  LOCATION: [facility]  DATE:  6/2/2024    INDICATION: Right upper quadrant pain. Dilated common bile duct on ultrasound.  COMPARISON: Ultrasound 6/2/2024.  TECHNIQUE: Routine MR liver/pancreas protocol including axial and coronal MRCP sequences. 2D and 3D reconstruction performed by MR technologist including MIP reconstruction and slab cholangiograms. If performed with contrast, additional dynamic T1 post   IV contrast images.  CONTRAST: Gadavist 7 mL     FINDINGS:     LIVER: Noncirrhotic liver morphology. Mild diffuse hepatic steatosis. No focal liver lesions.    GALLBLADDER/BILIARY: Gallbladder is diffusely edematous, mildly distended, and contains a single gallstone. Mild dilation of the common bile duct up to 0.9 cm with gradual tapering towards the ampulla. There is a 0.2 cm choledocholith within the distal   common bile duct near the ampulla (13/#24). No intrahepatic biliary ductal dilation.    PANCREAS: Multiple tiny cystic lesions within the tail of the pancreas measuring up to 0.7 cm (13/#22). No solid pancreatic mass. No dilation of the main pancreatic duct. Conventional ductal anatomy.    SPLEEN: Normal.    ADRENALS: Normal.    KIDNEYS: Normal.    BOWEL: No bowel obstruction or inflammation. Normal appendix.    LYMPH NODES: No enlarged lymph node. Patent portal, splenic, and superior mesenteric veins. No abdominal aortic aneurysm.    VASCULATURE: Normal.    LUNG BASES: Normal.    MUSCULOSKELETAL: No acute bony abnormality.      Impression    IMPRESSION:    1.  There is a 0.2 cm choledocholith within the distal common bile duct near the ampulla resulting in mild extrahepatic biliary ductal dilation. No intrahepatic biliary ductal dilation.     2.  Cholelithiasis with mild gallbladder distention and diffuse gallbladder wall edema, most likely related to the choledocholith. Other causes of gallbladder wall edema include IV hydration, reactive changes to hepatitis, and cholecystitis.    3.  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 No high-risk features. See follow-up guidelines below.    4.  Mild diffuse hepatic steatosis.    REFERENCE:  Revisions of international consensus [place] guidelines for the management of IPMN of the pancreas. Pancreatology 2017;17(5):738-753.    Largest cyst less than 10 mm: CT or MRI/MRCP in 6 months and then every 2 years if no change.       Discharge Medications   Discharge Medication List as of 6/3/2024  8:08 AM        CONTINUE these medications which have NOT CHANGED    Details   amoxicillin (AMOXIL) 500 MG tablet Take 500 mg by mouth 2 times daily as needed (flares (foliculitis)), Historical      cholecalciferol (VITAMIN D3) 25 mcg (1000 units) capsule Take 1 capsule by mouth daily, Historical      MULTIVITAMIN ORAL [MULTIVITAMIN ORAL] Take by mouth daily., Historical           Allergies   No Known Allergies

## 2024-07-01 NOTE — DISCHARGE SUMMARY
[facility]  Hospitalist Discharge Summary      Date of Admission:  6/2/2024  Date of Discharge:  6/3/2024  8:08 AM  Discharging Provider: Nathaniel Montes De Oca MD  Discharge Service: Hospitalist Service    Discharge Diagnoses   Choledocholithiasis    Clinically Significant Risk Factors          Follow-ups Needed After Discharge   Follow-up Appointments     Follow-up and recommended labs and tests       Follow up with primary care provider, Loy Powell, within 7 days for   hospital follow- up.        Transferred for ERCP    Unresulted Labs Ordered in the Past 30 Days of this Admission       No orders found from 5/4/2024 to 6/4/2024.        These results will be followed up by NA    Discharge Disposition   Transferred to [facility] for ERCP  Condition at discharge: Stable    Hospital Course   Trini Salter is a 53 year old male admitted on 6/2/2024. He presents with acute abdominal pain and found to have choledocholithiasis. Per SOC, no beds at [facility], admit to [facility], keep at [facility] and on 6/3/24 patient will go to [facility] for ERCP and then back to [facility].     Addendum: patient was never transferred back to [facility] and the entirety of his care was completed at [facility] per chart review. Events at [facility] / outside hospital per EMR.     Consultations This Hospital Stay   None    Code Status   Prior    Time Spent on this Encounter   I, Nathaniel Montes De Oca MD, personally saw the patient today and spent greater than 30 minutes discharging this patient.       Nathaniel Montes De Oca MD  [address] [address]  Phone: [phone]  Fax: [phone]  ______________________________________________________________________    Physical Exam   Vital Signs:                    Weight: 0 lbs 0 oz         Primary Care Physician   Loy Powell    Discharge Orders      Reason for your hospital stay    Gallstones in both the  "gallbladder and ducts in the liver with infection / infection risk; these are called \"cholecystitis\" and \"choledocholithiasis\" and you were treated for both with general surgery, gastroenterology, and internal medicine teams. You received a procedure called ERCP for the choledocholithiasis.     Follow-up and recommended labs and tests     Follow up with primary care provider, Loy Powell, within 7 days for hospital follow- up.     Activity    Your activity upon discharge: activity as tolerated     Diet    Follow this diet upon discharge: Orders Placed This Encounter      Advance Diet as Tolerated: Regular Diet Adult         Significant Results and Procedures   Most Recent 3 CBC's:  Recent Labs   Lab Test 06/04/24  0701 06/03/24  0646 06/02/24  0519   WBC 10.4 8.9 12.7*   HGB 14.1 14.6 13.9   MCV 88 88 87    249 257     Most Recent 3 BMP's:  Recent Labs   Lab Test 06/26/24  0951 06/10/24  0930 06/04/24  0701    140 140   POTASSIUM 4.3 4.2 4.3   CHLORIDE 102 103 102   CO2 27 28 28   BUN 16.4 15.0 14.8   CR 0.91 0.90 1.00   ANIONGAP 11 9 10   SAI 9.9 9.9 9.2   * 107* 155*     Most Recent 2 LFT's:  Recent Labs   Lab Test 06/26/24  0951 06/10/24  0930   AST 51* 42   ALT 60 93*   ALKPHOS 74 85   BILITOTAL 0.8 0.6   ,   Results for orders placed or performed during the hospital encounter of 06/02/24   US Abdomen Limited    Narrative    EXAM: ULTRASOUND ABDOMEN LIMITED  LOCATION: [facility]  DATE: 06/02/2024    INDICATION: Right upper quadrant pain.  COMPARISON: 03/14/2024.  TECHNIQUE: Limited abdominal ultrasound.    FINDINGS:    GALLBLADDER: Cholelithiasis. No gallbladder wall thickening. Negative sonographic Hudson's sign. No evidence for cholecystitis. Ringdown artifact seen in the gallbladder wall compatible with adenomyomatosis.    BILE DUCTS: No intrahepatic biliary dilatation. Common bile duct, however, is dilated up to 1.1 cm.    LIVER: Normal parenchyma with " smooth contour. No focal mass.    RIGHT KIDNEY: No hydronephrosis.    PANCREAS: Limited visualization due to overlying bowel gas.    No ascites.      Impression    IMPRESSION:  1.  Cholelithiasis without evidence for cholecystitis.    2.  However, there is dilatation of the common bile duct up to 1.1 cm. Cannot exclude distal common bile duct stone or obstructing process. Correlation with biliary enzymes and consider MRCP for further evaluation.    3.  Incidentally noted is adenomyomatosis of the gallbladder.     MR Abdomen MRCP w/o & w Contrast    Narrative    EXAM: MR ABDOMEN MRCP W/O and W CONTRAST  LOCATION: [facility]  DATE: 6/2/2024    INDICATION: Right upper quadrant pain. Dilated common bile duct on ultrasound.  COMPARISON: Ultrasound 6/2/2024.  TECHNIQUE: Routine MR liver/pancreas protocol including axial and coronal MRCP sequences. 2D and 3D reconstruction performed by MR technologist including MIP reconstruction and slab cholangiograms. If performed with contrast, additional dynamic T1 post   IV contrast images.  CONTRAST: Gadavist 7 mL     FINDINGS:     LIVER: Noncirrhotic liver morphology. Mild diffuse hepatic steatosis. No focal liver lesions.    GALLBLADDER/BILIARY: Gallbladder is diffusely edematous, mildly distended, and contains a single gallstone. Mild dilation of the common bile duct up to 0.9 cm with gradual tapering towards the ampulla. There is a 0.2 cm choledocholith within the distal   common bile duct near the ampulla (13/#24). No intrahepatic biliary ductal dilation.    PANCREAS: Multiple tiny cystic lesions within the tail of the pancreas measuring up to 0.7 cm (13/#22). No solid pancreatic mass. No dilation of the main pancreatic duct. Conventional ductal anatomy.    SPLEEN: Normal.    ADRENALS: Normal.    KIDNEYS: Normal.    BOWEL: No bowel obstruction or inflammation. Normal appendix.    LYMPH NODES: No enlarged lymph node. Patent portal, splenic, and superior  mesenteric veins. No abdominal aortic aneurysm.    VASCULATURE: Normal.    LUNG BASES: Normal.    MUSCULOSKELETAL: No acute bony abnormality.      Impression    IMPRESSION:    1.  There is a 0.2 cm choledocholith within the distal common bile duct near the ampulla resulting in mild extrahepatic biliary ductal dilation. No intrahepatic biliary ductal dilation.     2.  Cholelithiasis with mild gallbladder distention and diffuse gallbladder wall edema, most likely related to the choledocholith. Other causes of gallbladder wall edema include IV hydration, reactive changes to hepatitis, and cholecystitis.    3.  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 No high-risk features. See follow-up guidelines below.    4.  Mild diffuse hepatic steatosis.    REFERENCE:  Revisions of international consensus [place] guidelines for the management of IPMN of the pancreas. Pancreatology 2017;17(5):738-753.    Largest cyst less than 10 mm: CT or MRI/MRCP in 6 months and then every 2 years if no change.       Discharge Medications   Discharge Medication List as of 6/4/2024 10:34 AM        CONTINUE these medications which have NOT CHANGED    Details   amoxicillin (AMOXIL) 500 MG tablet Take 500 mg by mouth 2 times daily as needed (flares (foliculitis)), Historical      cholecalciferol (VITAMIN D3) 25 mcg (1000 units) capsule Take 1 capsule by mouth daily, Historical      MULTIVITAMIN ORAL [MULTIVITAMIN ORAL] Take by mouth daily., Historical           Allergies   No Known Allergies

## 2025-06-26 ENCOUNTER — OFFICE VISIT (OUTPATIENT)
Dept: FAMILY MEDICINE | Facility: CLINIC | Age: 55
End: 2025-06-26
Attending: FAMILY MEDICINE
Payer: COMMERCIAL

## 2025-06-26 VITALS
OXYGEN SATURATION: 99 % | HEIGHT: 65 IN | HEART RATE: 54 BPM | SYSTOLIC BLOOD PRESSURE: 118 MMHG | BODY MASS INDEX: 25.71 KG/M2 | TEMPERATURE: 97.6 F | WEIGHT: 154.3 LBS | DIASTOLIC BLOOD PRESSURE: 72 MMHG

## 2025-06-26 DIAGNOSIS — E78.2 MIXED HYPERLIPIDEMIA: ICD-10-CM

## 2025-06-26 DIAGNOSIS — Z12.5 SCREENING FOR PROSTATE CANCER: ICD-10-CM

## 2025-06-26 DIAGNOSIS — R73.03 PREDIABETES: ICD-10-CM

## 2025-06-26 DIAGNOSIS — K64.4 EXTERNAL HEMORRHOIDS: ICD-10-CM

## 2025-06-26 DIAGNOSIS — Z00.00 PHYSICAL EXAM: ICD-10-CM

## 2025-06-26 DIAGNOSIS — Z13.6 SCREENING FOR CARDIOVASCULAR CONDITION: Primary | ICD-10-CM

## 2025-06-26 LAB
ALBUMIN SERPL BCG-MCNC: 4.4 G/DL (ref 3.5–5.2)
ALP SERPL-CCNC: 65 U/L (ref 40–150)
ALT SERPL W P-5'-P-CCNC: 78 U/L (ref 0–70)
ANION GAP SERPL CALCULATED.3IONS-SCNC: 10 MMOL/L (ref 7–15)
AST SERPL W P-5'-P-CCNC: 61 U/L (ref 0–45)
BILIRUB SERPL-MCNC: 0.5 MG/DL
BUN SERPL-MCNC: 14.8 MG/DL (ref 6–20)
CALCIUM SERPL-MCNC: 9.9 MG/DL (ref 8.8–10.4)
CHLORIDE SERPL-SCNC: 101 MMOL/L (ref 98–107)
CHOLEST SERPL-MCNC: 242 MG/DL
CREAT SERPL-MCNC: 0.84 MG/DL (ref 0.67–1.17)
EGFRCR SERPLBLD CKD-EPI 2021: >90 ML/MIN/1.73M2
EST. AVERAGE GLUCOSE BLD GHB EST-MCNC: 126 MG/DL
FASTING STATUS PATIENT QL REPORTED: YES
FASTING STATUS PATIENT QL REPORTED: YES
GLUCOSE SERPL-MCNC: 105 MG/DL (ref 70–99)
HBA1C MFR BLD: 6 % (ref 0–5.6)
HCO3 SERPL-SCNC: 28 MMOL/L (ref 22–29)
HDLC SERPL-MCNC: 47 MG/DL
LDLC SERPL CALC-MCNC: 156 MG/DL
NONHDLC SERPL-MCNC: 195 MG/DL
POTASSIUM SERPL-SCNC: 4.4 MMOL/L (ref 3.4–5.3)
PROT SERPL-MCNC: 7.3 G/DL (ref 6.4–8.3)
PSA SERPL DL<=0.01 NG/ML-MCNC: 1.61 NG/ML (ref 0–3.5)
SODIUM SERPL-SCNC: 139 MMOL/L (ref 135–145)
TRIGL SERPL-MCNC: 193 MG/DL

## 2025-06-26 RX ORDER — MINOXIDIL 2.5 MG/1
2.5 TABLET ORAL
COMMUNITY
Start: 2024-10-24 | End: 2025-06-26

## 2025-06-26 SDOH — HEALTH STABILITY: PHYSICAL HEALTH: ON AVERAGE, HOW MANY MINUTES DO YOU ENGAGE IN EXERCISE AT THIS LEVEL?: 40 MIN

## 2025-06-26 SDOH — HEALTH STABILITY: PHYSICAL HEALTH: ON AVERAGE, HOW MANY DAYS PER WEEK DO YOU ENGAGE IN MODERATE TO STRENUOUS EXERCISE (LIKE A BRISK WALK)?: 5 DAYS

## 2025-06-26 ASSESSMENT — SOCIAL DETERMINANTS OF HEALTH (SDOH): HOW OFTEN DO YOU GET TOGETHER WITH FRIENDS OR RELATIVES?: ONCE A WEEK

## 2025-06-26 NOTE — PROGRESS NOTES
"Preventive Care Visit  [facility] MARYSOL Powell MD, Family Medicine  Jun 26, 2025      Assessment & Plan     (Z13.6) Screening for cardiovascular condition  (primary encounter diagnosis)  Comment: Patient is at low risk for cardiovascular disease.  Plan:      (Z00.00) Physical exam  Comment: Patient overall has very good health habits and is in good health  Plan: PRIMARY CARE FOLLOW-UP SCHEDULING             (E78.2) Mixed hyperlipidemia  Comment: Elevated attempting to control with diet and exercise  Plan: Lipid panel reflex to direct LDL Non-fasting,         Comprehensive metabolic panel             (R73.03) Prediabetes  Comment: Noted previously no family history of type 2 diabetes mellitus  Plan: Hemoglobin A1c             (Z12.5) Screening for prostate cancer  Comment: No family history of prostate cancer  Plan: PROSTATE SPEC ANTIGEN SCREEN             (K64.4) External hemorrhoids  Comment: Patient has had a banding procedure, takes MiraLAX daily.  Plan:      PLAN:  1.  Laboratory studies as above  2.  Patient to overall continue his healthy habits and should be seen yearly.  3.  The longitudinal plan of care for the diagnosis(es)/condition(s) as documented were addressed during this visit. Due to the added complexity in care, I will continue to support Card in the subsequent management and with ongoing continuity of care.                BMI  Estimated body mass index is 25.68 kg/m  as calculated from the following:    Height as of this encounter: 1.651 m (5' 5\").    Weight as of this encounter: 70 kg (154 lb 4.8 oz).   Weight management plan: Discussed healthy diet and exercise guidelines  Reviewed preventive health counseling, as reflected in patient instructions  Counseling  Appropriate preventive services were addressed with this patient via screening, questionnaire, or discussion as appropriate for fall prevention, nutrition, physical activity, Tobacco-use cessation, social " engagement, weight loss and cognition.  Checklist reviewing preventive services available has been given to the patient.  Reviewed patient's diet, addressing concerns and/or questions.             Jeff Card is a 54 year old, presenting for the following:  Physical (fasting)        6/26/2025     9:21 AM   Additional Questions   Roomed by FIOR Wong          HPI   - Kyaw Salter, 54-year-old male  - Last visit approximately one year ago  - Previously diagnosed as prediabetic; A1c was 5.7 last year  - Underwent gallbladder surgery about a year ago; no complications or changes in bowel habits reported  - History of hemorrhoids; underwent banding procedure three times, still present  - No issues with straining or constipation; takes Miralax regularly  - Quit smoking over 20 years ago; no alcohol or drug use  - Parents are alive; mother had a tumor, father has Parkinson's disease  - Two brothers, at least one with cholesterol issues  - No personal history of asthma or breathing problems  - No eye issues; uses readers occasionally  - No hearing concerns  - No stomach, joint, neck, or back issues reported  - Diet mainly consists of [place] food; limits rice intake to one serving per day or every other day  - Works as a  from home; does not feel isolated, meets friends weekly  -  with two children; anticipates potential empty nest syndrome when children go to college        Advance Care Planning    Document on file is a Health Care Directive or POLST.        6/26/2025   General Health   How would you rate your overall physical health? Excellent   Feel stress (tense, anxious, or unable to sleep) Not at all         6/26/2025   Nutrition   Three or more servings of calcium each day? Yes   Diet: Regular (no restrictions)   How many servings of fruit and vegetables per day? (!) 0-1   How many sweetened beverages each day? 0-1         6/26/2025   Exercise   Days per week of moderate/strenous exercise  5 days   Average minutes spent exercising at this level 40 min         2025   Social Factors   Frequency of gathering with friends or relatives Once a week   Worry food won't last until get money to buy more No   Food not last or not have enough money for food? No   Do you have housing? (Housing is defined as stable permanent housing and does not include staying outside in a car, in a tent, in an abandoned building, in an overnight shelter, or couch-surfing.) Yes   Are you worried about losing your housing? No   Lack of transportation? No   Unable to get utilities (heat,electricity)? No         2025   Fall Risk   Fallen 2 or more times in the past year? No   Trouble with walking or balance? No          2025   Dental   Dentist two times every year? Yes         Today's PHQ-2 Score:       2025     9:12 AM   PHQ-2 (  [employer])   Q1: Little interest or pleasure in doing things 0   Q2: Feeling down, depressed or hopeless 0   PHQ-2 Score 0    Q1: Little interest or pleasure in doing things Not at all   Q2: Feeling down, depressed or hopeless Not at all   PHQ-2 Score 0       Patient-reported           2025   Substance Use   Alcohol more than 3/day or more than 7/wk No   Do you use any other substances recreationally? No     Social History     Tobacco Use    Smoking status: Former     Current packs/day: 0.00     Types: Cigarettes     Quit date: 9/10/2003     Years since quittin.8     Passive exposure: Never    Smokeless tobacco: Never    Tobacco comments:     Light   Vaping Use    Vaping status: Never Used   Substance Use Topics    Alcohol use: No     Comment: Alcoholic Drinks/day:      Drug use: No           2025   STI Screening   New sexual partner(s) since last STI/HIV test? No   ASCVD Risk   The 10-year ASCVD risk score (Abdirahman PAULA, et al., 2019) is: 5.9%    Values used to calculate the score:      Age: 54 years      Sex: Male      Is Non- : No       Diabetic: No      Tobacco smoker: No      Systolic Blood Pressure: 118 mmHg      Is BP treated: No      HDL Cholesterol: 45 mg/dL      Total Cholesterol: 266 mg/dL           Reviewed and updated as needed this visit by Provider    Allergies  Meds  Problems  Med Hx  Surg Hx  Fam Hx            History reviewed. No pertinent past medical history.  Past Surgical History:   Procedure Laterality Date    ENDOSCOPIC RETROGRADE CHOLANGIOPANCREATOGRAM N/A 6/3/2024    Procedure: ENDOSCOPIC RETROGRADE CHOLANGIOPANCREATOGRAPHY, BILIARY SPHINCTEROTOMY, STONE REMOVAL;  Surgeon: Castro Chase MD;  Location: [facility]    ESOPHAGOSCOPY, GASTROSCOPY, DUODENOSCOPY (EGD), COMBINED N/A 4/26/2024    Procedure: ENDOSCOPIC ULTRASOUND;  Surgeon: Dmitriy Gan MD;  Location: [facility]    LAPAROSCOPIC CHOLECYSTECTOMY N/A 6/3/2024    Procedure: CHOLECYSTECTOMY, LAPAROSCOPIC;  Surgeon: Destiny Hummel MD;  Location: [facility]     Lab work is in process  Labs reviewed in EPIC  BP Readings from Last 3 Encounters:   06/26/25 118/72   06/26/24 100/60   06/10/24 124/78    Wt Readings from Last 3 Encounters:   06/26/25 70 kg (154 lb 4.8 oz)   06/26/24 67.6 kg (149 lb)   06/10/24 68.4 kg (150 lb 11.2 oz)                  Patient Active Problem List   Diagnosis    External hemorrhoids    Hyperlipidemia    Folliculitis    Prediabetes     Past Surgical History:   Procedure Laterality Date    ENDOSCOPIC RETROGRADE CHOLANGIOPANCREATOGRAM N/A 6/3/2024    Procedure: ENDOSCOPIC RETROGRADE CHOLANGIOPANCREATOGRAPHY, BILIARY SPHINCTEROTOMY, STONE REMOVAL;  Surgeon: Castro Chase MD;  Location: [facility]    ESOPHAGOSCOPY, GASTROSCOPY, DUODENOSCOPY (EGD), COMBINED N/A 4/26/2024    Procedure: ENDOSCOPIC ULTRASOUND;  Surgeon: Dmitriy Gan MD;  Location: [facility]    LAPAROSCOPIC CHOLECYSTECTOMY N/A 6/3/2024    Procedure: CHOLECYSTECTOMY, LAPAROSCOPIC;  Surgeon: Destiny Hummel  "MD;  Location: [facility] OR       Social History     Tobacco Use    Smoking status: Former     Current packs/day: 0.00     Types: Cigarettes     Quit date: 9/10/2003     Years since quittin.8     Passive exposure: Never    Smokeless tobacco: Never    Tobacco comments:     Light   Substance Use Topics    Alcohol use: No     Comment: Alcoholic Drinks/day:       Family History   Problem Relation Age of Onset    Cancer Mother         Ovarian    Parkinsonism Father     Hypotension Father     Benign prostatic hyperplasia Father     Hyperlipidemia Brother         Meds    Dementia Paternal Grandfather     Parkinsonism Paternal Grandfather          Current Outpatient Medications   Medication Sig Dispense Refill    amoxicillin (AMOXIL) 500 MG tablet Take 500 mg by mouth 2 times daily as needed (flares (foliculitis))      cholecalciferol (VITAMIN D3) 25 mcg (1000 units) capsule Take 1 capsule by mouth daily      MULTIVITAMIN ORAL [MULTIVITAMIN ORAL] Take by mouth daily.       No Known Allergies           Review of Systems  Constitutional, HEENT, cardiovascular, pulmonary, GI, , musculoskeletal, neuro, skin, endocrine and psych systems are negative, except as otherwise noted.     Objective    Exam  /72 (BP Location: Left arm, Patient Position: Sitting, Cuff Size: Adult Regular)   Pulse 54   Temp 97.6  F (36.4  C) (Oral)   Ht 1.651 m (5' 5\")   Wt 70 kg (154 lb 4.8 oz)   SpO2 99%   BMI 25.68 kg/m     Estimated body mass index is 25.68 kg/m  as calculated from the following:    Height as of this encounter: 1.651 m (5' 5\").    Weight as of this encounter: 70 kg (154 lb 4.8 oz).    Physical Exam  GENERAL: alert and no distress  EYES: Eyes grossly normal to inspection, PERRL and conjunctivae and sclerae normal  HENT: ear canals and TM's normal, nose and mouth without ulcers or lesions  NECK: no adenopathy, no asymmetry, masses, or scars  RESP: lungs clear to auscultation - no rales, rhonchi or wheezes  CV: " regular rate and rhythm, normal S1 S2, no S3 or S4, no murmur, click or rub, no peripheral edema  ABDOMEN: soft, nontender, no hepatosplenomegaly, no masses and bowel sounds normal  MS: no gross musculoskeletal defects noted, no edema  SKIN: no suspicious lesions or rashes  NEURO: Normal strength and tone, mentation intact and speech normal  PSYCH: mentation appears normal, affect normal/bright        Signed Electronically by: Loy Powell MD

## 2025-06-26 NOTE — PATIENT INSTRUCTIONS
Assessment & Plan  Screening for cardiovascular condition:  - Cardiovascular screening is part of routine health maintenance.  - Blood work to check cholesterol levels.    Mixed hyperlipidemia:  - History of cholesterol issues, family history noted.  - Blood work to monitor cholesterol levels.    Prediabetes:  - Previously identified as prediabetic with an A1c of 5.7.  - Recheck A1c with blood work to monitor blood sugar levels.    Screening for prostate cancer:  - Routine screening for prostate cancer.  - Check PSA levels with blood work.

## 2025-06-30 DIAGNOSIS — E78.49 OTHER HYPERLIPIDEMIA: Primary | ICD-10-CM

## 2025-06-30 DIAGNOSIS — R73.03 PREDIABETES: ICD-10-CM

## (undated) DEVICE — WIRE GUIDE 0.35X270CM STRAIGHT TIP VISIGLIDE G-260-3527S

## (undated) DEVICE — ENDO TROCAR SLEEVE KII Z-THREADED 05X100MM CTS02

## (undated) DEVICE — ENDO POUCH UNIV RETRIEVAL SYSTEM INZII 10MM CD001

## (undated) DEVICE — SOL RINGERS LACTATED 1000ML BAG 2B2324X

## (undated) DEVICE — DECANTER VIAL 2006S

## (undated) DEVICE — GLOVE UNDER INDICATOR PI SZ 6.5 LF 41665

## (undated) DEVICE — CUSTOM PACK LAP CHOLE SBA5BLCHEA

## (undated) DEVICE — NDL INSUFFLATION 13GA 120MM C2201

## (undated) DEVICE — ESU GROUND PAD ADULT REM W/15' CORD E7507DB

## (undated) DEVICE — SPHINCTEROTOME BILIARY NDL KNIFE 5MM 5FR TL  4584

## (undated) DEVICE — WIRE GUIDE TRACER METRO DIRECT .021"X260CM STR TIP G55705

## (undated) DEVICE — BALLOON EXTRACTION 15X1950MM 3.2MM TL B-V243Q-A

## (undated) DEVICE — GLOVE UNDER INDICATOR PI SZ 7.0 LF 41670

## (undated) DEVICE — ENDO FUSION OMNI-TOME 21 FS-OMNI-21 G48675

## (undated) DEVICE — CLIP APPLIER ENDO ROTATING 10MM MED/LG ER320

## (undated) DEVICE — SOL WATER IRRIG 1000ML BOTTLE 2F7114

## (undated) DEVICE — SURGICEL ABSORBABLE HEMOSTAT SNOW 2"X4" 2082

## (undated) DEVICE — TUBING SMOKE EVAC PNEUMOCLEAR HIGH FLOW 0620050250

## (undated) DEVICE — SU MONOCRYL+ 4-0 18IN PS2 UND MCP496G

## (undated) DEVICE — TUBING SUCTION MEDI-VAC 1/4"X20' N620A

## (undated) DEVICE — SUCTION MANIFOLD NEPTUNE 2 SYS 1 PORT 702-025-000

## (undated) DEVICE — ENDO FUSION OMNI-TOME G31903

## (undated) DEVICE — GLOVE PI ULTRATCH M LF SZ 6.5 PF CUFF TEXT STRL LF 42665

## (undated) DEVICE — GOWN LG DISP 9515

## (undated) DEVICE — ENDO SHEARS RENEW LAP ENDOCUT SCISSOR TIP 16.5MM 3142

## (undated) DEVICE — Device

## (undated) DEVICE — ENDO TROCAR FIRST ENTRY KII FIOS Z-THRD 05X100MM CTF03

## (undated) DEVICE — ENDO TROCAR FIRST ENTRY KII FIOS Z-THRD 11X100MM CTF33

## (undated) DEVICE — GOWN XLG DISP 9545

## (undated) DEVICE — SUCTION STRYKERFLOW II 250-070-500

## (undated) DEVICE — PREP CHLORAPREP 26ML TINTED HI-LITE ORANGE 930815

## (undated) DEVICE — GLOVE BIOGEL PI ULTRATOUCH G SZ 6.5 42165

## (undated) RX ORDER — LIDOCAINE HYDROCHLORIDE 10 MG/ML
INJECTION, SOLUTION EPIDURAL; INFILTRATION; INTRACAUDAL; PERINEURAL
Status: DISPENSED
Start: 2024-06-03

## (undated) RX ORDER — FENTANYL CITRATE-0.9 % NACL/PF 10 MCG/ML
PLASTIC BAG, INJECTION (ML) INTRAVENOUS
Status: DISPENSED
Start: 2024-04-26

## (undated) RX ORDER — PROPOFOL 10 MG/ML
INJECTION, EMULSION INTRAVENOUS
Status: DISPENSED
Start: 2024-06-03

## (undated) RX ORDER — PROPOFOL 10 MG/ML
INJECTION, EMULSION INTRAVENOUS
Status: DISPENSED
Start: 2024-04-26

## (undated) RX ORDER — INDOMETHACIN 50 MG/1
SUPPOSITORY RECTAL
Status: DISPENSED
Start: 2024-06-03

## (undated) RX ORDER — BUPIVACAINE HYDROCHLORIDE 2.5 MG/ML
INJECTION, SOLUTION EPIDURAL; INFILTRATION; INTRACAUDAL
Status: DISPENSED
Start: 2024-06-03

## (undated) RX ORDER — DEXAMETHASONE SODIUM PHOSPHATE 10 MG/ML
INJECTION, SOLUTION INTRAMUSCULAR; INTRAVENOUS
Status: DISPENSED
Start: 2024-06-03

## (undated) RX ORDER — FENTANYL CITRATE 50 UG/ML
INJECTION, SOLUTION INTRAMUSCULAR; INTRAVENOUS
Status: DISPENSED
Start: 2024-06-03

## (undated) RX ORDER — LIDOCAINE HYDROCHLORIDE 10 MG/ML
INJECTION, SOLUTION EPIDURAL; INFILTRATION; INTRACAUDAL; PERINEURAL
Status: DISPENSED
Start: 2024-04-26